# Patient Record
Sex: MALE | URBAN - METROPOLITAN AREA
[De-identification: names, ages, dates, MRNs, and addresses within clinical notes are randomized per-mention and may not be internally consistent; named-entity substitution may affect disease eponyms.]

---

## 2020-11-20 ENCOUNTER — APPOINTMENT (OUTPATIENT)
Dept: CT IMAGING | Age: 55
DRG: 057 | End: 2020-11-20
Payer: MEDICARE

## 2020-11-20 ENCOUNTER — APPOINTMENT (OUTPATIENT)
Dept: GENERAL RADIOLOGY | Age: 55
DRG: 057 | End: 2020-11-20
Payer: MEDICARE

## 2020-11-20 ENCOUNTER — HOSPITAL ENCOUNTER (INPATIENT)
Age: 55
LOS: 3 days | Discharge: HOME | DRG: 057 | End: 2020-11-23
Attending: EMERGENCY MEDICINE | Admitting: INTERNAL MEDICINE
Payer: MEDICARE

## 2020-11-20 PROBLEM — I63.9 ACUTE CEREBROVASCULAR ACCIDENT (CVA) (HCC): Status: ACTIVE | Noted: 2020-11-20

## 2020-11-20 LAB
A/G RATIO: 1.2 (ref 1.1–2.2)
ACETAMINOPHEN LEVEL: <5 UG/ML (ref 10–30)
ALBUMIN SERPL-MCNC: 4.4 G/DL (ref 3.4–5)
ALP BLD-CCNC: 79 U/L (ref 40–129)
ALT SERPL-CCNC: 13 U/L (ref 10–40)
AMPHETAMINE SCREEN, URINE: ABNORMAL
ANION GAP SERPL CALCULATED.3IONS-SCNC: 12 MMOL/L (ref 3–16)
AST SERPL-CCNC: 16 U/L (ref 15–37)
BARBITURATE SCREEN URINE: ABNORMAL
BASE EXCESS VENOUS: -1.7 MMOL/L (ref -2–3)
BASOPHILS ABSOLUTE: 0.1 K/UL (ref 0–0.2)
BASOPHILS RELATIVE PERCENT: 0.5 %
BENZODIAZEPINE SCREEN, URINE: ABNORMAL
BILIRUB SERPL-MCNC: 0.4 MG/DL (ref 0–1)
BILIRUBIN URINE: NEGATIVE
BLOOD, URINE: NEGATIVE
BUN BLDV-MCNC: 16 MG/DL (ref 7–20)
CALCIUM SERPL-MCNC: 9.6 MG/DL (ref 8.3–10.6)
CANNABINOID SCREEN URINE: POSITIVE
CARBOXYHEMOGLOBIN: 6.6 % (ref 0–1.5)
CHLORIDE BLD-SCNC: 103 MMOL/L (ref 99–110)
CLARITY: CLEAR
CO2: 23 MMOL/L (ref 21–32)
COCAINE METABOLITE SCREEN URINE: ABNORMAL
COLOR: YELLOW
CREAT SERPL-MCNC: 1.1 MG/DL (ref 0.9–1.3)
EKG ATRIAL RATE: 71 BPM
EKG DIAGNOSIS: NORMAL
EKG P-R INTERVAL: 132 MS
EKG Q-T INTERVAL: 446 MS
EKG QRS DURATION: 98 MS
EKG QTC CALCULATION (BAZETT): 484 MS
EKG R AXIS: 57 DEGREES
EKG T AXIS: 56 DEGREES
EKG VENTRICULAR RATE: 71 BPM
EOSINOPHILS ABSOLUTE: 0 K/UL (ref 0–0.6)
EOSINOPHILS RELATIVE PERCENT: 0.2 %
ETHANOL: NORMAL MG/DL (ref 0–0.08)
GFR AFRICAN AMERICAN: >60
GFR NON-AFRICAN AMERICAN: >60
GLOBULIN: 3.7 G/DL
GLUCOSE BLD-MCNC: 146 MG/DL (ref 70–99)
GLUCOSE BLD-MCNC: 157 MG/DL (ref 70–99)
GLUCOSE URINE: NEGATIVE MG/DL
HCO3 VENOUS: 24 MMOL/L (ref 24–28)
HCT VFR BLD CALC: 53.4 % (ref 40.5–52.5)
HEMOGLOBIN, VEN, REDUCED: 51.6 %
HEMOGLOBIN: 17.6 G/DL (ref 13.5–17.5)
INR BLD: 1.08 (ref 0.86–1.14)
KETONES, URINE: NEGATIVE MG/DL
LACTIC ACID: 1.1 MMOL/L (ref 0.4–2)
LACTIC ACID: 3.6 MMOL/L (ref 0.4–2)
LEUKOCYTE ESTERASE, URINE: NEGATIVE
LYMPHOCYTES ABSOLUTE: 2 K/UL (ref 1–5.1)
LYMPHOCYTES RELATIVE PERCENT: 11.1 %
Lab: ABNORMAL
MCH RBC QN AUTO: 31.8 PG (ref 26–34)
MCHC RBC AUTO-ENTMCNC: 33 G/DL (ref 31–36)
MCV RBC AUTO: 96.4 FL (ref 80–100)
METHADONE SCREEN, URINE: ABNORMAL
METHEMOGLOBIN VENOUS: 0.7 % (ref 0–1.5)
MICROSCOPIC EXAMINATION: NORMAL
MONOCYTES ABSOLUTE: 1 K/UL (ref 0–1.3)
MONOCYTES RELATIVE PERCENT: 5.2 %
NEUTROPHILS ABSOLUTE: 15.4 K/UL (ref 1.7–7.7)
NEUTROPHILS RELATIVE PERCENT: 83 %
NITRITE, URINE: NEGATIVE
O2 SAT, VEN: 44 %
OPIATE SCREEN URINE: ABNORMAL
OXYCODONE URINE: ABNORMAL
PCO2, VEN: 45.3 MMHG (ref 41–51)
PDW BLD-RTO: 15.1 % (ref 12.4–15.4)
PERFORMED ON: ABNORMAL
PH UA: 6
PH UA: 6 (ref 5–8)
PH VENOUS: 7.34 (ref 7.35–7.45)
PHENCYCLIDINE SCREEN URINE: ABNORMAL
PLATELET # BLD: 267 K/UL (ref 135–450)
PMV BLD AUTO: 9.8 FL (ref 5–10.5)
PO2, VEN: 26.2 MMHG (ref 25–40)
POTASSIUM REFLEX MAGNESIUM: 3.7 MMOL/L (ref 3.5–5.1)
PROPOXYPHENE SCREEN: ABNORMAL
PROTEIN UA: NEGATIVE MG/DL
PROTHROMBIN TIME: 12.5 SEC (ref 10–13.2)
RBC # BLD: 5.54 M/UL (ref 4.2–5.9)
SALICYLATE, SERUM: 1.2 MG/DL (ref 15–30)
SODIUM BLD-SCNC: 138 MMOL/L (ref 136–145)
SPECIFIC GRAVITY UA: 1.01 (ref 1–1.03)
TCO2 CALC VENOUS: 25 MMOL/L
TOTAL PROTEIN: 8.1 G/DL (ref 6.4–8.2)
TROPONIN: <0.01 NG/ML
URINE TYPE: NORMAL
UROBILINOGEN, URINE: 0.2 E.U./DL
WBC # BLD: 18.5 K/UL (ref 4–11)

## 2020-11-20 PROCEDURE — 2580000003 HC RX 258: Performed by: INTERNAL MEDICINE

## 2020-11-20 PROCEDURE — 82803 BLOOD GASES ANY COMBINATION: CPT

## 2020-11-20 PROCEDURE — 85025 COMPLETE CBC W/AUTO DIFF WBC: CPT

## 2020-11-20 PROCEDURE — 6370000000 HC RX 637 (ALT 250 FOR IP): Performed by: PHYSICIAN ASSISTANT

## 2020-11-20 PROCEDURE — U0003 INFECTIOUS AGENT DETECTION BY NUCLEIC ACID (DNA OR RNA); SEVERE ACUTE RESPIRATORY SYNDROME CORONAVIRUS 2 (SARS-COV-2) (CORONAVIRUS DISEASE [COVID-19]), AMPLIFIED PROBE TECHNIQUE, MAKING USE OF HIGH THROUGHPUT TECHNOLOGIES AS DESCRIBED BY CMS-2020-01-R: HCPCS

## 2020-11-20 PROCEDURE — 6360000002 HC RX W HCPCS: Performed by: PHYSICIAN ASSISTANT

## 2020-11-20 PROCEDURE — 6360000004 HC RX CONTRAST MEDICATION: Performed by: EMERGENCY MEDICINE

## 2020-11-20 PROCEDURE — 84484 ASSAY OF TROPONIN QUANT: CPT

## 2020-11-20 PROCEDURE — 36415 COLL VENOUS BLD VENIPUNCTURE: CPT

## 2020-11-20 PROCEDURE — G0480 DRUG TEST DEF 1-7 CLASSES: HCPCS

## 2020-11-20 PROCEDURE — 80307 DRUG TEST PRSMV CHEM ANLYZR: CPT

## 2020-11-20 PROCEDURE — 85610 PROTHROMBIN TIME: CPT

## 2020-11-20 PROCEDURE — 80053 COMPREHEN METABOLIC PANEL: CPT

## 2020-11-20 PROCEDURE — 6370000000 HC RX 637 (ALT 250 FOR IP): Performed by: INTERNAL MEDICINE

## 2020-11-20 PROCEDURE — 87040 BLOOD CULTURE FOR BACTERIA: CPT

## 2020-11-20 PROCEDURE — 99284 EMERGENCY DEPT VISIT MOD MDM: CPT

## 2020-11-20 PROCEDURE — 6360000002 HC RX W HCPCS

## 2020-11-20 PROCEDURE — 70496 CT ANGIOGRAPHY HEAD: CPT

## 2020-11-20 PROCEDURE — 2580000003 HC RX 258: Performed by: PHYSICIAN ASSISTANT

## 2020-11-20 PROCEDURE — 70450 CT HEAD/BRAIN W/O DYE: CPT

## 2020-11-20 PROCEDURE — 1200000000 HC SEMI PRIVATE

## 2020-11-20 PROCEDURE — 71045 X-RAY EXAM CHEST 1 VIEW: CPT

## 2020-11-20 PROCEDURE — 83605 ASSAY OF LACTIC ACID: CPT

## 2020-11-20 PROCEDURE — 81003 URINALYSIS AUTO W/O SCOPE: CPT

## 2020-11-20 PROCEDURE — 96374 THER/PROPH/DIAG INJ IV PUSH: CPT

## 2020-11-20 PROCEDURE — 70498 CT ANGIOGRAPHY NECK: CPT

## 2020-11-20 PROCEDURE — 93005 ELECTROCARDIOGRAM TRACING: CPT | Performed by: PHYSICIAN ASSISTANT

## 2020-11-20 RX ORDER — PROMETHAZINE HYDROCHLORIDE 25 MG/1
12.5 TABLET ORAL EVERY 6 HOURS PRN
Status: DISCONTINUED | OUTPATIENT
Start: 2020-11-20 | End: 2020-11-23 | Stop reason: HOSPADM

## 2020-11-20 RX ORDER — 0.9 % SODIUM CHLORIDE 0.9 %
1000 INTRAVENOUS SOLUTION INTRAVENOUS ONCE
Status: COMPLETED | OUTPATIENT
Start: 2020-11-20 | End: 2020-11-20

## 2020-11-20 RX ORDER — GABAPENTIN 300 MG/1
600 CAPSULE ORAL 3 TIMES DAILY
COMMUNITY

## 2020-11-20 RX ORDER — ASPIRIN 300 MG/1
300 SUPPOSITORY RECTAL DAILY
Status: DISCONTINUED | OUTPATIENT
Start: 2020-11-20 | End: 2020-11-23 | Stop reason: HOSPADM

## 2020-11-20 RX ORDER — POLYETHYLENE GLYCOL 3350 17 G/17G
17 POWDER, FOR SOLUTION ORAL DAILY PRN
Status: DISCONTINUED | OUTPATIENT
Start: 2020-11-20 | End: 2020-11-23 | Stop reason: HOSPADM

## 2020-11-20 RX ORDER — AZITHROMYCIN 250 MG/1
500 TABLET, FILM COATED ORAL ONCE
Status: COMPLETED | OUTPATIENT
Start: 2020-11-20 | End: 2020-11-20

## 2020-11-20 RX ORDER — CYCLOBENZAPRINE HCL 10 MG
10 TABLET ORAL 3 TIMES DAILY PRN
COMMUNITY

## 2020-11-20 RX ORDER — PANTOPRAZOLE SODIUM 40 MG/1
40 TABLET, DELAYED RELEASE ORAL
Status: DISCONTINUED | OUTPATIENT
Start: 2020-11-21 | End: 2020-11-23 | Stop reason: HOSPADM

## 2020-11-20 RX ORDER — DIPHENHYDRAMINE HYDROCHLORIDE 50 MG/ML
25 INJECTION INTRAMUSCULAR; INTRAVENOUS ONCE
Status: COMPLETED | OUTPATIENT
Start: 2020-11-20 | End: 2020-11-20

## 2020-11-20 RX ORDER — LIDOCAINE 40 MG/G
CREAM TOPICAL 3 TIMES DAILY PRN
COMMUNITY

## 2020-11-20 RX ORDER — IBUPROFEN 600 MG/1
600 TABLET ORAL 3 TIMES DAILY
Status: ON HOLD | COMMUNITY
End: 2020-11-23 | Stop reason: HOSPADM

## 2020-11-20 RX ORDER — LANOLIN ALCOHOL/MO/W.PET/CERES
3 CREAM (GRAM) TOPICAL NIGHTLY PRN
Status: DISCONTINUED | OUTPATIENT
Start: 2020-11-20 | End: 2020-11-23 | Stop reason: HOSPADM

## 2020-11-20 RX ORDER — ASPIRIN 81 MG/1
81 TABLET ORAL DAILY
Status: DISCONTINUED | OUTPATIENT
Start: 2020-11-20 | End: 2020-11-23 | Stop reason: HOSPADM

## 2020-11-20 RX ORDER — ONDANSETRON 2 MG/ML
4 INJECTION INTRAMUSCULAR; INTRAVENOUS EVERY 6 HOURS PRN
Status: DISCONTINUED | OUTPATIENT
Start: 2020-11-20 | End: 2020-11-23 | Stop reason: HOSPADM

## 2020-11-20 RX ORDER — GABAPENTIN 300 MG/1
600 CAPSULE ORAL 3 TIMES DAILY
Status: DISCONTINUED | OUTPATIENT
Start: 2020-11-20 | End: 2020-11-23 | Stop reason: HOSPADM

## 2020-11-20 RX ORDER — ATORVASTATIN CALCIUM 40 MG/1
40 TABLET, FILM COATED ORAL NIGHTLY
Status: DISCONTINUED | OUTPATIENT
Start: 2020-11-20 | End: 2020-11-23 | Stop reason: HOSPADM

## 2020-11-20 RX ORDER — SODIUM CHLORIDE 0.9 % (FLUSH) 0.9 %
10 SYRINGE (ML) INJECTION EVERY 12 HOURS SCHEDULED
Status: DISCONTINUED | OUTPATIENT
Start: 2020-11-20 | End: 2020-11-23 | Stop reason: HOSPADM

## 2020-11-20 RX ORDER — DIPHENHYDRAMINE HYDROCHLORIDE 50 MG/ML
INJECTION INTRAMUSCULAR; INTRAVENOUS
Status: COMPLETED
Start: 2020-11-20 | End: 2020-11-20

## 2020-11-20 RX ORDER — OMEPRAZOLE 20 MG/1
20 CAPSULE, DELAYED RELEASE ORAL DAILY
COMMUNITY

## 2020-11-20 RX ORDER — SODIUM CHLORIDE 0.9 % (FLUSH) 0.9 %
10 SYRINGE (ML) INJECTION PRN
Status: DISCONTINUED | OUTPATIENT
Start: 2020-11-20 | End: 2020-11-23 | Stop reason: HOSPADM

## 2020-11-20 RX ORDER — LANOLIN ALCOHOL/MO/W.PET/CERES
3 CREAM (GRAM) TOPICAL NIGHTLY PRN
Status: DISCONTINUED | OUTPATIENT
Start: 2020-11-21 | End: 2020-11-20

## 2020-11-20 RX ORDER — LABETALOL HYDROCHLORIDE 5 MG/ML
10 INJECTION, SOLUTION INTRAVENOUS ONCE
Status: DISCONTINUED | OUTPATIENT
Start: 2020-11-20 | End: 2020-11-23 | Stop reason: HOSPADM

## 2020-11-20 RX ORDER — ATORVASTATIN CALCIUM 80 MG/1
40 TABLET, FILM COATED ORAL NIGHTLY
Status: ON HOLD | COMMUNITY
End: 2020-11-23 | Stop reason: SDUPTHER

## 2020-11-20 RX ORDER — CYCLOBENZAPRINE HCL 10 MG
10 TABLET ORAL 3 TIMES DAILY PRN
Status: DISCONTINUED | OUTPATIENT
Start: 2020-11-20 | End: 2020-11-23 | Stop reason: HOSPADM

## 2020-11-20 RX ORDER — METHYLPREDNISOLONE 4 MG/1
4 TABLET ORAL SEE ADMIN INSTRUCTIONS
Status: ON HOLD | COMMUNITY
End: 2020-11-23 | Stop reason: HOSPADM

## 2020-11-20 RX ADMIN — DIPHENHYDRAMINE HYDROCHLORIDE 25 MG: 50 INJECTION INTRAMUSCULAR; INTRAVENOUS at 12:15

## 2020-11-20 RX ADMIN — GABAPENTIN 600 MG: 300 CAPSULE ORAL at 20:47

## 2020-11-20 RX ADMIN — CEFTRIAXONE 1 G: 1 INJECTION, POWDER, FOR SOLUTION INTRAMUSCULAR; INTRAVENOUS at 14:47

## 2020-11-20 RX ADMIN — Medication 10 ML: at 20:48

## 2020-11-20 RX ADMIN — SODIUM CHLORIDE 1000 ML: 9 INJECTION, SOLUTION INTRAVENOUS at 15:44

## 2020-11-20 RX ADMIN — IOPAMIDOL 80 ML: 755 INJECTION, SOLUTION INTRAVENOUS at 12:15

## 2020-11-20 RX ADMIN — AZITHROMYCIN MONOHYDRATE 500 MG: 250 TABLET ORAL at 14:47

## 2020-11-20 RX ADMIN — CYCLOBENZAPRINE 10 MG: 10 TABLET, FILM COATED ORAL at 20:48

## 2020-11-20 ASSESSMENT — PAIN DESCRIPTION - PAIN TYPE
TYPE: CHRONIC PAIN
TYPE: ACUTE PAIN
TYPE: CHRONIC PAIN

## 2020-11-20 ASSESSMENT — PAIN DESCRIPTION - ORIENTATION
ORIENTATION: MID
ORIENTATION: RIGHT;LEFT
ORIENTATION: LEFT

## 2020-11-20 ASSESSMENT — ENCOUNTER SYMPTOMS
COUGH: 0
ABDOMINAL PAIN: 0
SHORTNESS OF BREATH: 0

## 2020-11-20 ASSESSMENT — PAIN SCALES - GENERAL
PAINLEVEL_OUTOF10: 10
PAINLEVEL_OUTOF10: 10
PAINLEVEL_OUTOF10: 4

## 2020-11-20 ASSESSMENT — PAIN DESCRIPTION - LOCATION
LOCATION: BACK;NECK
LOCATION: GENERALIZED
LOCATION: KNEE

## 2020-11-20 ASSESSMENT — PAIN DESCRIPTION - DESCRIPTORS
DESCRIPTORS: ACHING;TIGHTNESS
DESCRIPTORS: TINGLING
DESCRIPTORS: ACHING

## 2020-11-20 ASSESSMENT — PAIN DESCRIPTION - DIRECTION: RADIATING_TOWARDS: `

## 2020-11-20 NOTE — ED PROVIDER NOTES
ED Attending Attestation Note     Date of evaluation: 11/20/2020    This patient was seen by the advance practice provider. I have seen and examined the patient, agree with the workup, evaluation, management and diagnosis. The care plan has been discussed. I have reviewed the ECG and concur with the NIURKA's interpretation. My assessment reveals presents with altered mental status. He had change in mental status at work. Was seen at the [place] yesterday for some medical reaso. When he comes into the ED here via ambulance he is very loud he is got some torticollis type movements of his upper extremities. He is complaining of left-sided numbness. Feels like his head is swimming. I did order Benadryl in case the prescribed medication may have caused a dystonic reaction.      Stephanie Sanchez MD  11/20/20 [id number]

## 2020-11-20 NOTE — ED PROVIDER NOTES
[address] ENCOUNTER          PHYSICIAN ASSISTANT NOTE       Date of evaluation: 11/20/2020    Chief Complaint     Altered Mental Status (seen at the [facility] yesterday, given steroid for unknown reason, now feels like \"brain is sideways\")      History of Present Illness     Emmanuel Eli is a 62 y.o. male with no known PMH who follows with the [facility] who presents to the ED via EMS with AMS, left sided headache, pain, numbness, and weakness that began less than an hour ago while at work. EMS reports patient is diaphoretic and will be speaking normally for a few sentences, but then yells and is clearly upset. EMS further reports a normal finger stick blood sugar. Pt states he feels like he is falling and cannot sit up properly while in the bed. Pt endorses left sided tingling and weakness, blurry vision and continuously yells that he does not feel right. Pt is able to recall that he had a stroke about 2 years ago. Pt denies any recent alcohol use or substance abuse. Pt is unable to properly answer multiple other questions by both EMS and ER staff. Review of Systems     Review of Systems   Constitutional: Positive for diaphoresis. Negative for fever. Eyes: Positive for visual disturbance. Respiratory: Negative for cough and shortness of breath. Cardiovascular: Negative for chest pain. Gastrointestinal: Negative for abdominal pain. Neurological: Positive for weakness, numbness and headaches. Psychiatric/Behavioral: Positive for agitation. Limited initial ROS due to patient status. Past Medical, Surgical, Family, and Social History     He has no past medical history on file. He has no past surgical history on file. His family history is not on file. He reports that he has been smoking. He has been smoking about 1.00 pack per day. He has never used smokeless tobacco. He reports current alcohol use. He reports current drug use. Drug: Marijuana.     Medications Previous Medications    ATORVASTATIN (LIPITOR) 80 MG TABLET    Take 40 mg by mouth nightly    CYCLOBENZAPRINE (FLEXERIL) 10 MG TABLET    Take 10 mg by mouth 3 times daily as needed for Muscle spasms    GABAPENTIN (NEURONTIN) 300 MG CAPSULE    Take 600 mg by mouth 3 times daily. IBUPROFEN (ADVIL;MOTRIN) 600 MG TABLET    Take 600 mg by mouth 3 times daily    LIDOCAINE (LMX) 4 % CREAM    Apply topically 3 times daily as needed for Pain Apply topically to painful joints    METHYLPREDNISOLONE (MEDROL DOSEPACK) 4 MG TABLET    Take 4 mg by mouth See Admin Instructions Rx given in [facility] ED on 11/18. OMEPRAZOLE (PRILOSEC) 20 MG DELAYED RELEASE CAPSULE    Take 20 mg by mouth daily       Allergies     He has No Known Allergies. Physical Exam     INITIAL VITALS: BP: (!) 183/119,Temp: 98.6 °F (37 °C), Pulse: 80, Resp: 11, SpO2: 99 %   Physical Exam  Constitutional:       General: He is in acute distress. Appearance: He is diaphoretic. HENT:      Head: Normocephalic and atraumatic. Eyes:      Extraocular Movements: Extraocular movements intact. Pupils: Pupils are equal, round, and reactive to light. Neck:      Musculoskeletal: Normal range of motion. No neck rigidity. Cardiovascular:      Rate and Rhythm: Regular rhythm. Tachycardia present. Heart sounds: Normal heart sounds. Pulmonary:      Breath sounds: Normal breath sounds. Abdominal:      Palpations: Abdomen is soft. Musculoskeletal:         General: No swelling. Skin:     General: Skin is warm. Capillary Refill: Capillary refill takes less than 2 seconds. Findings: No erythema or rash. Neurological:      Mental Status: He is alert. He is disoriented. Cranial Nerves: No dysarthria or facial asymmetry. Sensory: Sensation is intact. Motor: Weakness (Decreased  strength in left hand 1/5. Unable to lift and hold left arm and left leg. ) present.          Diagnostic Results     EKG   Interpreted in conjunction destructive osseous process or fracture. PARANASAL SINUSES / MASTOIDS: No acute sinusitis or mastoiditis. ORBITS: Normal.      EXTRACRANIAL SOFT TISSUES: Normal.      OTHER: None. IMPRESSION:      1. Occlusion left vertebral artery. 2. Narrowing M1 segment right middle cerebral artery. 3. Remote left occipital infarct. 4. Cerebral parenchymal volume loss and chronic small vessel ischemic changes in the white matter. CTA HEAD W CONTRAST   Final Result      1. Occlusion left vertebral artery. 2. 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 3. 1.4 x 0.9 cm left parotid nodule, nonspecific. PROCEDURE: CT ANGIOGRAPHY HEAD WITH/WITHOUT CONTRAST      INDICATION: headache, tingling      COMPARISON: CT head 11/20/2020. TECHNIQUE: Axial CT imaging obtained through the head prior to and following administration of IV contrast. Axial images, multiplanar reformatted images, and maximum intensity projection images were reviewed for CT angiographic technique. IV contrast: 80 mL Isovue 370. FINDINGS:      ANTERIOR CIRCULATION: Mild calcific plaque of the cavernous right internal carotid artery without stenosis. Anterior cerebral arteries normal. Mild narrowing of M1 segment right middle cerebral artery. POSTERIOR CIRCULATION: Occluded left vertebral artery. Mild atherosclerotic calcification of the right vertebral artery without stenosis. Patent basilar artery. Normal posterior cerebral arteries. INTRACRANIAL VENOUS SYSTEM: Small arachnoid granulations superior sagittal sinus. INTRACRANIAL HEMORRHAGE: None. VENTRICLES: Mildly dilated related to parenchymal volume loss. BRAIN PARENCHYMA: Encephalomalacia of left occipital lobe consistent with remote infarct stable compared to 11/20/2020 CT head. Mild cerebral parenchymal volume loss.  Mild low-attenuation in the deep and periventricular white matter. SKULL: No destructive osseous process or fracture. PARANASAL SINUSES / MASTOIDS: No acute sinusitis or mastoiditis. ORBITS: Normal.      EXTRACRANIAL SOFT TISSUES: Normal.      OTHER: None. IMPRESSION:      1. Occlusion left vertebral artery. 2. Narrowing M1 segment right middle cerebral artery. 3. Remote left occipital infarct. 4. Cerebral parenchymal volume loss and chronic small vessel ischemic changes in the white matter. CT HEAD WO CONTRAST   Final Result      1. Mild inferior right maxillary sinus mucosal thickening. No evidence of acute sinusitis. 2. Remote cortical infarct left occipital lobe. Mild small vessel ischemic disease. No hemorrhage, mass, or recent infarct identified.           LABS:   Results for orders placed or performed during the hospital encounter of 11/20/20   CBC Auto Differential   Result Value Ref Range    WBC 18.5 (H) 4.0 - 11.0 K/uL    RBC 5.54 4.20 - 5.90 M/uL    Hemoglobin 17.6 (H) 13.5 - 17.5 g/dL    Hematocrit 53.4 (H) 40.5 - 52.5 %    MCV 96.4 80.0 - 100.0 fL    MCH 31.8 26.0 - 34.0 pg    MCHC 33.0 31.0 - 36.0 g/dL    RDW 15.1 12.4 - 15.4 %    Platelets 246 564 - 563 K/uL    MPV 9.8 5.0 - 10.5 fL    Neutrophils % 83.0 %    Lymphocytes % 11.1 %    Monocytes % 5.2 %    Eosinophils % 0.2 %    Basophils % 0.5 %    Neutrophils Absolute 15.4 (H) 1.7 - 7.7 K/uL    Lymphocytes Absolute 2.0 1.0 - 5.1 K/uL    Monocytes Absolute 1.0 0.0 - 1.3 K/uL    Eosinophils Absolute 0.0 0.0 - 0.6 K/uL    Basophils Absolute 0.1 0.0 - 0.2 K/uL   Troponin   Result Value Ref Range    Troponin <0.01 <0.01 ng/mL   Comprehensive Metabolic Panel w/ Reflex to MG   Result Value Ref Range    Sodium 138 136 - 145 mmol/L    Potassium reflex Magnesium 3.7 3.5 - 5.1 mmol/L    Chloride 103 99 - 110 mmol/L    CO2 23 21 - 32 mmol/L    Anion Gap 12 3 - 16    Glucose 157 (H) 70 - 99 mg/dL    BUN 16 7 - 20 mg/dL    CREATININE 1.1 0.9 - 1.3 mg/dL GFR Non-African American >60 >60    GFR African American >60 >60    Calcium 9.6 8.3 - 10.6 mg/dL    Total Protein 8.1 6.4 - 8.2 g/dL    Alb 4.4 3.4 - 5.0 g/dL    Albumin/Globulin Ratio 1.2 1.1 - 2.2    Total Bilirubin 0.4 0.0 - 1.0 mg/dL    Alkaline Phosphatase 79 40 - 129 U/L    ALT 13 10 - 40 U/L    AST 16 15 - 37 U/L    Globulin 3.7 g/dL   Ethanol   Result Value Ref Range    Ethanol Lvl None Detected mg/dL   Salicylate   Result Value Ref Range    Salicylate, Serum 1.2 (L) 15.0 - 30.0 mg/dL   Acetaminophen level   Result Value Ref Range    Acetaminophen Level <5 (L) 10 - 30 ug/mL   Protime-INR   Result Value Ref Range    Protime 12.5 10.0 - 13.2 sec    INR 1.08 0.86 - 1.14   Lactate, plasma   Result Value Ref Range    Lactic Acid 3.6 (H) 0.4 - 2.0 mmol/L   Blood gas, venous (Lab)   Result Value Ref Range    pH, Mateo 7.343 (L) 7.350 - 7.450    pCO2, Mateo 45.3 41.0 - 51.0 mmHg    pO2, Mateo 26.2 25.0 - 40.0 mmHg    HCO3, Venous 24.0 24.0 - 28.0 mmol/L    Base Excess, Mateo -1.7 -2.0 - 3.0 mmol/L    O2 Sat, Mateo 44 Not established %    Carboxyhemoglobin 6.6 (H) 0.0 - 1.5 %    MetHgb, Mateo 0.7 0.0 - 1.5 %    TC02 (Calc), Mateo 25 mmol/L    Hemoglobin, Mateo, Reduced 51.60 %   POCT Glucose   Result Value Ref Range    POC Glucose 146 (H) 70 - 99 mg/dl    Performed on ACCU-CHEK    EKG 12 Lead   Result Value Ref Range    Ventricular Rate 71 BPM    Atrial Rate 71 BPM    P-R Interval 132 ms    QRS Duration 98 ms    Q-T Interval 446 ms    QTc Calculation (Bazett) 484 ms    R Axis 57 degrees    T Axis 56 degrees    Diagnosis       EKG performed in ER and to be interpreted by ER physician. Confirmed by MD, ER ([id number]),  Joao Graham ([phone]) on 11/20/2020 12:36:24 PM       RECENT VITALS:  BP: (!) 178/106, Temp: 98.6 °F (37 °C), Pulse: 61,Resp: 11, SpO2: 97 %     Procedures       ED Course     Nursing Notes, Past Medical Hx, Past Surgical Hx, Social Hx, Allergies, and Family Hx were reviewed.     The patient was given the followingmedications:  Orders Placed This Encounter   Medications    diphenhydrAMINE (BENADRYL) 50 MG/ML injection     ANISA HIDALGO: cabinet override    diphenhydrAMINE (BENADRYL) injection 25 mg    iopamidol (ISOVUE-370) 76 % injection 80 mL    labetalol (NORMODYNE;TRANDATE) injection 10 mg    cefTRIAXone (ROCEPHIN) 1 g IVPB in 50 mL D5W minibag     Order Specific Question:   Antimicrobial Indications     Answer:   Pneumonia (CAP)    azithromycin (ZITHROMAX) tablet 500 mg     Order Specific Question:   Antimicrobial Indications     Answer:   Pneumonia (CAP)       CONSULTS:  IP CONSULT TO PHARMACY  PHARMACY TO CHANGE BASE FLUIDS  IP CONSULT TO STROKE TEAM  IP CONSULT TO HOSPITALIST  IP CONSULT TO NEUROSURGERY  IP CONSULT TO [address] [place] [address] / ASSESSMENT / Peyton Seek is a 62 y.o. male who is followed by the [place] presents with altered mental status as well as with various symptoms including left sided headache, visual changes, left arm/leg numbness and weakness. Presentation, timing of symptom onset and physical exam were all concerning for a stroke. A code stroke was called and protocol was followed. IV was accessed, 25mg Benadryl was given (for possible dystonic rxn), blood work and CT scan were obtained. After head CT was completed, pt returned to room. Repeat exam revealed resolved left sided weakness/numbness. No focal deficits. Patient A&Ox3. His mental status improved and he was able to explain his symptoms further. Pt endorses left sided ear pain, dizziness and weakness in his left side, although admits these symptoms are greatly improving and pt is able to move all extremities and speak in full sentences. Pt reports he had similar symptoms 2 days ago, was seen at the [facility] and given a steroid prescription. Pt explains his symptoms resolved once he went home from the hospital, but today they came back and were much worse.  Pt currently denies any chest pain, shortness of breath, abdominal pain, N/V/D, or leg swelling. There are no further complaints at this time. Case was discussed with the stroke team but given rapidly improving symptoms, TPA was not given. IV access was established. Labs including CBC, BMP, Troponin, BNP, VBG and Lactate, CXR, and head CT/CTA were obtained. CXR showed a right medial basilar consolidation. CT head revealed no acute hemorrhage. It did reveal a remote left occipital infarct. CTA results revealed left vertebral occulusion and aneurysmal dilation of the left internal carotid artery as mentioned above in the results. The initial left sided deficits on physical exam do not correlate with the CTA findings which are also on the left side. CBC showed bumped white count to 18.5, which could be due to the patient's current steroid dose pack or the lung consolidation. However, lactate elevated to 3.6 points towards infection. Pt was swabbed for COVID-19, Rocephin & Zithromax were given to cover CAP and case discussed with hospitalist for admission for TIA rule out and community acquired pneumonia. This patient was also evaluated by the attending physician. All care plans were discussed and agreed upon. Clinical Impression     1. TIA (transient ischemic attack)    2. Pneumonia due to organism    3. Leukocytosis, unspecified type    4.  Numbness on left side        Disposition      DISPOSITION:   - Admission to: Medicine  - Time: 14:30       Joanna Tan PA-C  11/20/20 [id number]

## 2020-11-20 NOTE — ED NOTES
Home Med List is complete. Spoke with pharmacist from [facility] on phone. Given emergency situation, list verbally read over the phone and ROBERT will be faxed later. Medications in list are directly from the [place]. Please note:   1. Medrol Dose pack -- given an Rx for Medrol Dose pack on 11/18 from ED visit.         Meng Mckeon PharmD., BCPS   11/20/2020 12:32 PM  Wireless: [phone]

## 2020-11-20 NOTE — ED NOTES
Pt refuses to be catheterized.  PT reports he will try to urinate \"soon\"     Alejandra Azar RN  11/20/20 9473

## 2020-11-20 NOTE — H&P
Hospital Medicine History & Physical      PCP: No primary care provider on file. Date of Admission: 11/20/2020    Date of Service: Pt seen/examined on 11/20/2020    Chief Complaint:      Chief Complaint   Patient presents with    Altered Mental Status     seen at the [facility] yesterday, given steroid for unknown reason, now feels like \"brain is sideways\"       History Of Present Illness:   61-year-old male with no significant past medical history presented to the hospital with complains of left-sided numbness which initially started couple of days ago. Patient states that today his symptoms became really severe and he was having significant amount of numbness in his entire left side and felt like his nerves were being pressed. He denied any chest pain, shortness of breath, nausea or vomiting. He denied any weakness. Due to his symptoms not improving he decided to come to the hospital.  On arrival he was noted to be hypertensive blood pressure 180/120. Lab work showed lactic acid of 3.6 and leukocytosis of 18.5. CT of the head showed remote cortical infarct in the left occipital lobe. X-ray of the chest showed right medial basilar consolidation. CT angiogram head and neck was also performed which showed left vertebral artery occlusion and aneurysmal dilation of left proximal internal and distal common carotid artery with mural thrombus and atherosclerotic plaque. Patient was also noted to have some left-sided weakness while in the ED which actually improved after he came back from CT scan. Patient was also started on antibiotics for potential pneumonia and was admitted to the hospital for further management. Past Medical History:    No past medical history on file. Past Surgical History:    No past surgical history on file. Medications Prior to Admission:    Prior to Admission medications    Medication Sig Start Date End Date Taking?  Authorizing Provider   atorvastatin (LIPITOR) 80 MG tablet Take 40 mg by mouth nightly   Yes Historical Provider, MD   cyclobenzaprine (FLEXERIL) 10 MG tablet Take 10 mg by mouth 3 times daily as needed for Muscle spasms   Yes Historical Provider, MD   gabapentin (NEURONTIN) 300 MG capsule Take 600 mg by mouth 3 times daily. Yes Historical Provider, MD   ibuprofen (ADVIL;MOTRIN) 600 MG tablet Take 600 mg by mouth 3 times daily   Yes Historical Provider, MD   lidocaine (LMX) 4 % cream Apply topically 3 times daily as needed for Pain Apply topically to painful joints   Yes Historical Provider, MD   methylPREDNISolone (MEDROL DOSEPACK) 4 MG tablet Take 4 mg by mouth See Admin Instructions Rx given in [facility] ED on 11/18. Yes Historical Provider, MD   omeprazole (PRILOSEC) 20 MG delayed release capsule Take 20 mg by mouth daily   Yes Historical Provider, MD       Allergies:  Patient has no known allergies. Social History:       reports that he has been smoking. He has been smoking about 1.00 pack per day. He has never used smokeless tobacco. He reports current alcohol use. He reports current drug use. Drug: Marijuana. Family History:  Reviewed in detail and negative for DM, Early CAD, Cancer, CVA. Positive as follows:    No family history on file. REVIEW OF SYSTEMS:   Positive review  noted in the HPI. All other systems reviewed and negative.     PHYSICAL EXAM:    BP (!) 178/106   Pulse 61   Temp 98.6 °F (37 °C) (Oral)   Resp 11   Wt 212 lb 4.8 oz (96.3 kg)   SpO2 97%   General Appearance: alert and oriented to person, place and time, well developed and well- nourished, in no acute distress  Skin: warm and dry, no rash or erythema  Head: normocephalic and atraumatic  Eyes: pupils equal, round, and reactive to light, extraocular eye movements intact, conjunctivae normal  ENT: tympanic membrane, external ear and ear canal normal bilaterally, nose without deformity, nasal mucosa and turbinates normal without polyps  Neck: supple and non-tender without mass, no thyromegaly or thyroid nodules, no cervical lymphadenopathy  Pulmonary/Chest: clear to auscultation bilaterally- no wheezes, rales or rhonchi, normal air movement, no respiratory distress  Cardiovascular: normal rate, regular rhythm, normal S1 and S2, no murmurs, rubs, clicks, or gallops, Peripheral pulses good, Cap refill <3 sec, no carotid bruits  Abdomen: soft, non-tender, non-distended, normal bowel sounds, no masses or organomegaly  Extremities: no cyanosis, clubbing or edema  Musculoskeletal: normal range of motion, no joint swelling, deformity or tenderness  Neurologic: reflexes normal and symmetric, no cranial nerve deficit, gait, coordination and speech normal      LABS:    CBC   Recent Labs     11/20/20  1201   WBC 18.5*   HGB 17.6*   HCT 53.4*         RENAL  Recent Labs     11/20/20  1201      K 3.7      CO2 23   BUN 16   CREATININE 1.1     LFT'S  Recent Labs     11/20/20  1201   AST 16   ALT 13   BILITOT 0.4   ALKPHOS 79     COAG  Recent Labs     11/20/20  1202   INR 1.08     CARDIAC ENZYMES  Recent Labs     11/20/20  1201   TROPONINI <0.01       U/A:    Lab Results   Component Value Date    COLORU Yellow 11/20/2020    CLARITYU Clear 11/20/2020    SPECGRAV 1.015 11/20/2020    LEUKOCYTESUR Negative 11/20/2020    BLOODU Negative 11/20/2020    GLUCOSEU Negative 11/20/2020       ABG  No results found for: SCC2EUU, BEART, C6TTWGTH, PHART, THGBART, YAH9OXI, PO2ART, UJS7FLF    UA:  Recent Labs     11/20/20  1532   COLORU Yellow   PHUR 6.0  6.0   CLARITYU Clear   SPECGRAV 1.015   LEUKOCYTESUR Negative   UROBILINOGEN 0.2   BILIRUBINUR Negative   BLOODU Negative   GLUCOSEU Negative   KETUA Negative       Microbiology:  No results for input(s): LABGRAM, LABANAE, ORG, CXSURG in the last 72 hours. Nasal Culture: No results for input(s): ORG, MRSAPCR in the last 72 hours. Blood Culture: No results for input(s): BC, BLOODCULT2, ORG in the last 72 hours.   Fungal Culture:   No results for input(s): FUNGSM in the last 72 hours. No results for input(s): FUNCXBLD in the last 72 hours. CSF Culture:  No results for input(s): COLORCSF, APPEARCSF, CFTUBE, CLOTCSF, WBCCSF, RBCCSF, NEUTCSF, NUMCELLSCSF, LYMPHSCSF, MONOCSF, GLUCCSF, VOLCSF in the last 72 hours. Respiratory Culture:  No results for input(s): Bita Mercury in the last 72 hours. AFB:No results for input(s): AFBSMEAR in the last 72 hours. Urine Culture  No results for input(s): LABURIN in the last 72 hours. RADIOLOGY:    XR CHEST PORTABLE   Final Result      1. Right medial basilar consolidation   2. Normal-appearing heart size               CTA NECK W CONTRAST   Final Result      1. Occlusion left vertebral artery. 2. 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 3. 1.4 x 0.9 cm left parotid nodule, nonspecific. PROCEDURE: CT ANGIOGRAPHY HEAD WITH/WITHOUT CONTRAST      INDICATION: headache, tingling      COMPARISON: CT head 11/20/2020. TECHNIQUE: Axial CT imaging obtained through the head prior to and following administration of IV contrast. Axial images, multiplanar reformatted images, and maximum intensity projection images were reviewed for CT angiographic technique. IV contrast: 80 mL Isovue 370. FINDINGS:      ANTERIOR CIRCULATION: Mild calcific plaque of the cavernous right internal carotid artery without stenosis. Anterior cerebral arteries normal. Mild narrowing of M1 segment right middle cerebral artery. POSTERIOR CIRCULATION: Occluded left vertebral artery. Mild atherosclerotic calcification of the right vertebral artery without stenosis. Patent basilar artery. Normal posterior cerebral arteries. INTRACRANIAL VENOUS SYSTEM: Small arachnoid granulations superior sagittal sinus. INTRACRANIAL HEMORRHAGE: None. VENTRICLES: Mildly dilated related to parenchymal volume loss.       BRAIN PARENCHYMA: Encephalomalacia loss.      BRAIN PARENCHYMA: Encephalomalacia of left occipital lobe consistent with remote infarct stable compared to 11/20/2020 CT head. Mild cerebral parenchymal volume loss. Mild low-attenuation in the deep and periventricular white matter. SKULL: No destructive osseous process or fracture. PARANASAL SINUSES / MASTOIDS: No acute sinusitis or mastoiditis. ORBITS: Normal.      EXTRACRANIAL SOFT TISSUES: Normal.      OTHER: None. IMPRESSION:      1. Occlusion left vertebral artery. 2. Narrowing M1 segment right middle cerebral artery. 3. Remote left occipital infarct. 4. Cerebral parenchymal volume loss and chronic small vessel ischemic changes in the white matter. CT HEAD WO CONTRAST   Final Result      1. Mild inferior right maxillary sinus mucosal thickening. No evidence of acute sinusitis. 2. Remote cortical infarct left occipital lobe. Mild small vessel ischemic disease. No hemorrhage, mass, or recent infarct identified.           Previous medical records personally reviewed and analyzed         PHYSICIAN CERTIFICATION    I certify that Arlene Kumar is expected to be hospitalized for >2 midnights based on the following assessment and plan:    ASSESSMENT/PLAN:    Left arm and leg paresthesias concerning for TIA versus acute CVA  -Continue aspirin, statin  -MRI of the brain   -Neurochecks as appropriate  -Echo with bubble study  -Neurology consultation  -PT/OT, speech eval    Hypertensive urgency  -Permissive hypertension for now unless systolic blood pressure greater than 220    Severe sepsis secondary to pneumonia  -Continue Rocephin, azithromycin  -Cultures have been ordered  -Rule out COVID-19    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

## 2020-11-20 NOTE — ACP (ADVANCE CARE PLANNING)
ADVANCED CARE PLANNING    Martine Michael       :  1965              MRN:  [id number]      Purpose of Encounter: Advanced care planning in light of acute medical problems. Parties in attendance: :Jessica Swartz, Rebekah Barrett MD, . Decisional Capacity:Yes    Subjective: Patient/family understand in this voluntary conversation what inteventions and plans patient would want implemented in light of the following diagnoses:    Diagnosis: Active Problems:    Acute cerebrovascular accident (CVA) ([address]  Resolved Problems:    * No resolved hospital problems. *    Patients Medical Story: 57-year-old male with no significant past medical history presented to the hospital with left-sided numbness and tingling which have been going on for the past 3 days. Also noted to be in severe sepsis secondary pneumonia with lactic acidosis. Discussion highlights: I discussed with patient advanced directives/ impending END of life event in the light of above diagnosis, we discussed the needs for possible CPR, intubation/ventilator support, CPR. In such an event patient wishes to remain Full Code at this time. Goals of Care Determinations: Patient wishes to focus on getting better  Plan: Continue treatment for pneumonia and work-up symptoms of CVA  Code Status: At this time patient wishes to be full code  Time Spent with Patient: >16 minutes      Electronically signed by Rebekah Barrett MD on 2020 at 6:03 PM  Thank you No primary care provider on file. for the opportunity to be involved in this patient's care.

## 2020-11-21 LAB
CHOLESTEROL, TOTAL: 162 MG/DL (ref 0–199)
HCT VFR BLD CALC: 50.1 % (ref 40.5–52.5)
HDLC SERPL-MCNC: 38 MG/DL (ref 40–60)
HEMOGLOBIN: 16.8 G/DL (ref 13.5–17.5)
LDL CHOLESTEROL CALCULATED: 109 MG/DL
MCH RBC QN AUTO: 31.8 PG (ref 26–34)
MCHC RBC AUTO-ENTMCNC: 33.5 G/DL (ref 31–36)
MCV RBC AUTO: 95.1 FL (ref 80–100)
PDW BLD-RTO: 14.3 % (ref 12.4–15.4)
PLATELET # BLD: 211 K/UL (ref 135–450)
PMV BLD AUTO: 9.8 FL (ref 5–10.5)
PROCALCITONIN: 0.04 NG/ML (ref 0–0.15)
RBC # BLD: 5.27 M/UL (ref 4.2–5.9)
TRIGL SERPL-MCNC: 75 MG/DL (ref 0–150)
VLDLC SERPL CALC-MCNC: 15 MG/DL
WBC # BLD: 11.9 K/UL (ref 4–11)

## 2020-11-21 PROCEDURE — 97161 PT EVAL LOW COMPLEX 20 MIN: CPT

## 2020-11-21 PROCEDURE — 87070 CULTURE OTHR SPECIMN AEROBIC: CPT

## 2020-11-21 PROCEDURE — 80061 LIPID PANEL: CPT

## 2020-11-21 PROCEDURE — 87449 NOS EACH ORGANISM AG IA: CPT

## 2020-11-21 PROCEDURE — 1200000000 HC SEMI PRIVATE

## 2020-11-21 PROCEDURE — 83036 HEMOGLOBIN GLYCOSYLATED A1C: CPT

## 2020-11-21 PROCEDURE — 6360000002 HC RX W HCPCS: Performed by: INTERNAL MEDICINE

## 2020-11-21 PROCEDURE — 6370000000 HC RX 637 (ALT 250 FOR IP): Performed by: INTERNAL MEDICINE

## 2020-11-21 PROCEDURE — 85027 COMPLETE CBC AUTOMATED: CPT

## 2020-11-21 PROCEDURE — 6370000000 HC RX 637 (ALT 250 FOR IP): Performed by: STUDENT IN AN ORGANIZED HEALTH CARE EDUCATION/TRAINING PROGRAM

## 2020-11-21 PROCEDURE — 87205 SMEAR GRAM STAIN: CPT

## 2020-11-21 PROCEDURE — 2580000003 HC RX 258: Performed by: INTERNAL MEDICINE

## 2020-11-21 PROCEDURE — 84145 PROCALCITONIN (PCT): CPT

## 2020-11-21 PROCEDURE — 97116 GAIT TRAINING THERAPY: CPT

## 2020-11-21 PROCEDURE — 87040 BLOOD CULTURE FOR BACTERIA: CPT

## 2020-11-21 RX ORDER — CLOPIDOGREL BISULFATE 75 MG/1
75 TABLET ORAL DAILY
Status: DISCONTINUED | OUTPATIENT
Start: 2020-11-21 | End: 2020-11-23 | Stop reason: HOSPADM

## 2020-11-21 RX ADMIN — Medication 10 ML: at 22:00

## 2020-11-21 RX ADMIN — GABAPENTIN 600 MG: 300 CAPSULE ORAL at 21:51

## 2020-11-21 RX ADMIN — Medication 10 ML: at 09:01

## 2020-11-21 RX ADMIN — GABAPENTIN 600 MG: 300 CAPSULE ORAL at 14:18

## 2020-11-21 RX ADMIN — ASPIRIN 81 MG: 81 TABLET, COATED ORAL at 09:00

## 2020-11-21 RX ADMIN — GABAPENTIN 600 MG: 300 CAPSULE ORAL at 09:00

## 2020-11-21 RX ADMIN — CEFTRIAXONE 1 G: 1 INJECTION, POWDER, FOR SOLUTION INTRAMUSCULAR; INTRAVENOUS at 17:37

## 2020-11-21 RX ADMIN — Medication 3 MG: at 00:02

## 2020-11-21 RX ADMIN — ATORVASTATIN CALCIUM 40 MG: 40 TABLET, FILM COATED ORAL at 21:51

## 2020-11-21 RX ADMIN — PANTOPRAZOLE SODIUM 40 MG: 40 TABLET, DELAYED RELEASE ORAL at 05:36

## 2020-11-21 RX ADMIN — AZITHROMYCIN MONOHYDRATE 500 MG: 500 INJECTION, POWDER, LYOPHILIZED, FOR SOLUTION INTRAVENOUS at 14:18

## 2020-11-21 RX ADMIN — CLOPIDOGREL BISULFATE 75 MG: 75 TABLET ORAL at 14:18

## 2020-11-21 ASSESSMENT — PAIN DESCRIPTION - PROGRESSION: CLINICAL_PROGRESSION: NOT CHANGED

## 2020-11-21 ASSESSMENT — PAIN DESCRIPTION - PAIN TYPE: TYPE: ACUTE PAIN

## 2020-11-21 ASSESSMENT — PAIN DESCRIPTION - DESCRIPTORS: DESCRIPTORS: ACHING

## 2020-11-21 ASSESSMENT — PAIN SCALES - GENERAL
PAINLEVEL_OUTOF10: 6
PAINLEVEL_OUTOF10: 0
PAINLEVEL_OUTOF10: 0

## 2020-11-21 ASSESSMENT — PAIN DESCRIPTION - LOCATION: LOCATION: HEAD

## 2020-11-21 ASSESSMENT — PAIN DESCRIPTION - FREQUENCY: FREQUENCY: INTERMITTENT

## 2020-11-21 ASSESSMENT — PAIN DESCRIPTION - ONSET: ONSET: ON-GOING

## 2020-11-21 ASSESSMENT — PAIN - FUNCTIONAL ASSESSMENT: PAIN_FUNCTIONAL_ASSESSMENT: ACTIVITIES ARE NOT PREVENTED

## 2020-11-21 ASSESSMENT — PAIN DESCRIPTION - ORIENTATION: ORIENTATION: MID

## 2020-11-21 NOTE — PLAN OF CARE
Problem: Pain:  Goal: Pain level will decrease  Description: Pain level will decrease  Outcome: Ongoing  Note: C/o chronic pain in back/neck/knees. States consistent with chronic arthritis. Relieved with home medications. Will update as needed.

## 2020-11-21 NOTE — PROGRESS NOTES
auscultation, bilaterally without Rales/Wheezes/Rhonchi. Cardiovascular: Regular rate and rhythm with normal S1/S2 without murmurs, rubs or gallops. Abdomen: Soft, non-tender, non-distended with normal bowel sounds. Musculoskeletal: No clubbing, cyanosis or edema bilaterally. Full range of motion without deformity. Skin: Skin color, texture, turgor normal.  No rashes or lesions. Neurologic:  Neurovascularly intact without any focal sensory/motor deficits. Cranial nerves: II-XII intact, grossly non-focal.  Psychiatric: Alert and oriented, thought content appropriate, normal insight  Capillary Refill: Brisk,< 3 seconds   Peripheral Pulses: +2 palpable, equal bilaterally       Labs:   Recent Labs     11/20/20  1201 11/21/20  0601   WBC 18.5* 11.9*   HGB 17.6* 16.8   HCT 53.4* 50.1    211     Recent Labs     11/20/20  1201      K 3.7      CO2 23   BUN 16   CREATININE 1.1   CALCIUM 9.6     Recent Labs     11/20/20  1201   AST 16   ALT 13   BILITOT 0.4   ALKPHOS 79     Recent Labs     11/20/20  1202   INR 1.08     Recent Labs     11/20/20  1201   TROPONINI <0.01       Urinalysis:      Lab Results   Component Value Date    NITRU Negative 11/20/2020    BLOODU Negative 11/20/2020    SPECGRAV 1.015 11/20/2020    GLUCOSEU Negative 11/20/2020       Radiology:  XR CHEST PORTABLE   Final Result      1. Right medial basilar consolidation   2. Normal-appearing heart size               CTA NECK W CONTRAST   Final Result      1. Occlusion left vertebral artery. 2. 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 3. 1.4 x 0.9 cm left parotid nodule, nonspecific. PROCEDURE: CT ANGIOGRAPHY HEAD WITH/WITHOUT CONTRAST      INDICATION: headache, tingling      COMPARISON: CT head 11/20/2020.       TECHNIQUE: Axial CT imaging obtained through the head prior to and following administration of IV contrast. Axial images, following administration of IV contrast. Axial images, multiplanar reformatted images, and maximum intensity projection images were reviewed for CT angiographic technique. IV contrast: 80 mL Isovue 370. FINDINGS:      ANTERIOR CIRCULATION: Mild calcific plaque of the cavernous right internal carotid artery without stenosis. Anterior cerebral arteries normal. Mild narrowing of M1 segment right middle cerebral artery. POSTERIOR CIRCULATION: Occluded left vertebral artery. Mild atherosclerotic calcification of the right vertebral artery without stenosis. Patent basilar artery. Normal posterior cerebral arteries. INTRACRANIAL VENOUS SYSTEM: Small arachnoid granulations superior sagittal sinus. INTRACRANIAL HEMORRHAGE: None. VENTRICLES: Mildly dilated related to parenchymal volume loss. BRAIN PARENCHYMA: Encephalomalacia of left occipital lobe consistent with remote infarct stable compared to 11/20/2020 CT head. Mild cerebral parenchymal volume loss. Mild low-attenuation in the deep and periventricular white matter. SKULL: No destructive osseous process or fracture. PARANASAL SINUSES / MASTOIDS: No acute sinusitis or mastoiditis. ORBITS: Normal.      EXTRACRANIAL SOFT TISSUES: Normal.      OTHER: None. IMPRESSION:      1. Occlusion left vertebral artery. 2. Narrowing M1 segment right middle cerebral artery. 3. Remote left occipital infarct. 4. Cerebral parenchymal volume loss and chronic small vessel ischemic changes in the white matter. CT HEAD WO CONTRAST   Final Result      1. Mild inferior right maxillary sinus mucosal thickening. No evidence of acute sinusitis. 2. Remote cortical infarct left occipital lobe. Mild small vessel ischemic disease. No hemorrhage, mass, or recent infarct identified. Assessment/Plan:    Active Hospital Problems    Diagnosis    Acute cerebrovascular accident (CVA) ([facility] [address] [I63.9]     1.   This is a 61-year-old male admitted with a left arm numbness and tingling history of a CVA continue with aspirin and high-dose statin, patient refusing MRI. Neurology consult pending. 2.  Hypertension continue with antihypertensive medication per stroke protocol. 3.  ?  Sepsis leukocytosis on admission improving check procalcitonin level patient denies any symptoms. 4.  Urine drug screen positive for marijuana. DVT Prophylaxis: Lovenox subcu  Diet: DIET NO SALT ADDED (3-4 GM);  Low Sodium (2 GM)  Code Status: Full Code    PT/OT Eval Status:     Ama Garg MD

## 2020-11-21 NOTE — PROGRESS NOTES
Speech Language Pathology    Received order for speech evaluation. Reviewed chart. Spoke with RN. RN with no concerns at this time re: speech/swallowing, with pt currently tolerating regular texture diet / thin liquids. In an effort to conserve PPE/reduce exposure, will hold, and follow-up when patient out of COVID r/o, or if new concerns arise.     Kyle Alatorre M.A., [id number] [facility].[id number]  Speech-Language Pathologist  Pager: [phone]

## 2020-11-21 NOTE — CONSULTS
Neurology Consultation Note    Pt is COVID-19 rule-out and in attempts to limit exposure and/or spread of virus, face-to-face interview and examination are deferred at this time. Patient: Blair Thurman MRN: [id number]    YOB: 1965  Age: 62 y.o. Sex: male   Unit: [facility] [address] [facility]U Room/Bed: 9762/1251-82 Location: [address] [place] [address]    Date of Consultation: 11/21/2020  Date of Admission: 11/20/2020 11:51 AM ( LOS: 1 day )  Admitting Physician: Beau Elmore    Primary Care Physician: No primary care provider on file.    Consult Requested By: Wes Posada MD     Reason for Consult: \"cvava\"    ASSESSMENT & RECOMMENDATIONS     Assessment  - Symptoms consistent with likely acute ischemic stroke  - Per Hospitalist note, pt is refusing MRI; without this, very little else can be determined as to nature of stroke and in what ways stroke prevention can be achieved  - Less likely artery to artery stroke given that, as noted in Vascular Surgery note, all of the areas of potential arterial disease are on the left and for vessels to be involved in this event, the issues would have to be on the right (whereas remote left occipital stroke is either related to now occluded left vert, or, left vert occlusion (and therefore left occipital lobe infarct) is secondary to cardioembolism)  - If embolic in nature, then would need extensive cardiac evaluation; of course, without MRI this is much harder to determine  - Clearly has risk factors of poorly controlled BP and smoking which could be responsible for stroke but need controlling nonetheless    Recommendations  - Offer pt adequate amount of Ativan in order to attempt MRI brain  - Agree with ASA 81mg and Plavix 75mg daily, even though he was ASA naive prior to admission; would do this for 14 days and then switch to ASA 325mg daily monotherapy  - Increase atorvastatin to 80mg daily (high-dose)  - SBP goal <160 for next week and then ultimate goal of SBP <130-140 AND DBP <90 at ALL times  - Maintain A1c <7.0  - If pt refuses MRI and therefore embolic pattern of stroke cannot be excluded, then will need 30 day cardiac event monitor followed by loop recorder, if event monitor negative for afib/flutter  - Depending on results of echo, focus on cardiac source of emboli may be made more compelling (eg, sig dilated LA may suggest PAF, although would also be more evidence of poorly controlled BP as well)  - Agree with f/u with Vascular Surgery as outpt to address left carotid stenosis, etc  - Leukocytosis as per primary service      SUBJECTIVE     Chief Complaint:   Consult is for acute left-sided numbness +/- left-sided mild, transient, weakness    History of Present Illness:  Arlene Kumar is a 62 y.o. man with PHx sig for no known medical issues but he is a smoker. Per chart review, pt presented to ER with left-sided numbness which had been ongoing for the two days prior. These symptoms worsened on the day of admission and it felt like the entire left side was involved. He did not report any weakness with this but was documented as having left-sided weakness in the ER. His BP was 180/120. He had a leukocytosis of 18.5, CXR showed some consolidation, and empiric antibiotics for PNA were started. CT showed a remote left occipital lobe cortical infarct. CTA of head and neck showed left vert occlusion as well as aneurysmal dilation of left prox ICA and distal CCA. Mural thrombus along with atherosclerotic plaque were apparent. his chronic medical conditions include, but are not limited to, his smoking which is chronic and ongoing and for which he has been conseled. he also has a history of GERD which is under good control on his current regimen.  he also has a history of HTN which is under good control on his current regimen     Past Medical History:  smoking      Past Surgical History:  Past Surgical History:   Procedure Laterality Date    APPENDECTOMY         Family History:  family history is not available at this time    Social History:  he reports that he has been smoking. He has been smoking about 1.00 pack per day. He has never used smokeless tobacco. He reports previous alcohol use. He reports current drug use. Drug: Marijuana. Medications:  No Known Allergies    Medications Prior to Admission: atorvastatin (LIPITOR) 80 MG tablet, Take 40 mg by mouth nightly  cyclobenzaprine (FLEXERIL) 10 MG tablet, Take 10 mg by mouth 3 times daily as needed for Muscle spasms  gabapentin (NEURONTIN) 300 MG capsule, Take 600 mg by mouth 3 times daily. ibuprofen (ADVIL;MOTRIN) 600 MG tablet, Take 600 mg by mouth 3 times daily  lidocaine (LMX) 4 % cream, Apply topically 3 times daily as needed for Pain Apply topically to painful joints  methylPREDNISolone (MEDROL DOSEPACK) 4 MG tablet, Take 4 mg by mouth See Admin Instructions Rx given in [place] ED on 11/18. omeprazole (PRILOSEC) 20 MG delayed release capsule, Take 20 mg by mouth daily    Review of Systems:  Unable to obtain    OBJECTIVE     Vitals:  Patient Vitals for the past 36 hrs:   BP Temp Temp src Pulse Resp SpO2 Height Weight   11/21/20 0825 (!) 137/93 99 °F (37.2 °C) Oral 64 14 94 % -- --   11/21/20 0600 -- -- -- 59 -- -- -- --   11/21/20 0515 (!) 139/91 98.5 °F (36.9 °C) Oral 69 18 96 % -- --   11/21/20 0200 -- -- -- 61 22 -- -- --   11/21/20 0000 -- -- -- 59 -- -- -- --   11/20/20 2335 129/89 97.8 °F (36.6 °C) Oral 64 16 96 % -- --   11/20/20 1936 (!) 184/108 97.6 °F (36.4 °C) Oral 70 16 100 % 5' 9\" (1.753 m) 212 lb 4.9 oz (96.3 kg)   11/20/20 1330 (!) 178/106 -- -- 61 11 97 % -- --   11/20/20 1159 (!) 183/119 98.6 °F (37 °C) Oral 80 11 99 % -- 212 lb 4.8 oz (96.3 kg)       Neurological Exam   Pt not personally examined due to COVID-19 pandemic and he is rule-out status for this. Per pt's bedside RN charting:  Pt without any neurologic deficits at this time    Imaging:   All reports below personally reviewed & actual images reviewed where indicated. Pertinent positives & negatives are addressed in Assessment & Plan section of note  CTA HEAD & NECK W CONTRAST  Images independently reviewed. Agree with findings. --LIANNE    Final Result   1. Occlusion left vertebral artery. 2. 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 3. 1.4 x 0.9 cm left parotid nodule, nonspecific. 4. Narrowing M1 segment right middle cerebral artery. 5. Remote left occipital infarct. 6. Cerebral parenchymal volume loss and chronic small vessel ischemic changes in the white matter. CT HEAD WO CONTRAST  Images independently reviewed. Agree with findings. --LIANNE    Final Result   1. Mild inferior right maxillary sinus mucosal thickening. No evidence of acute sinusitis. 2. Remote cortical infarct left occipital lobe. Mild small vessel ischemic disease. No hemorrhage, mass, or recent infarct identified. Laboratory Review: All results below personally reviewed. Pertinent positives & negatives are addressed in Assessment & Plan section of note  Recent Results (from the past 72 hour(s))   EKG 12 Lead    Collection Time: 11/20/20 11:58 AM   Result Value Ref Range    Ventricular Rate 71 BPM    Atrial Rate 71 BPM    P-R Interval 132 ms    QRS Duration 98 ms    Q-T Interval 446 ms    QTc Calculation (Bazett) 484 ms    R Axis 57 degrees    T Axis 56 degrees    Diagnosis       EKG performed in ER and to be interpreted by ER physician. Confirmed by MD, ER ([id number]),  Ramsey Daniels ([phone]) on 11/20/2020 12:36:24 PM   CBC Auto Differential    Collection Time: 11/20/20 12:01 PM   Result Value Ref Range    WBC 18.5 (H) 4.0 - 11.0 K/uL    RBC 5.54 4.20 - 5.90 M/uL    Hemoglobin 17.6 (H) 13.5 - 17.5 g/dL    Hematocrit 53.4 (H) 40.5 - 52.5 %    MCV 96.4 80.0 - 100.0 fL    MCH 31.8 26.0 - 34.0 pg    MCHC 33.0 31.0 - 36.0 g/dL    RDW 15.1 12.4 - 15.4 %    Platelets 267 135 - 450 K/uL    MPV 9.8 5.0 - 10.5 fL    Neutrophils % 83.0 %    Lymphocytes % 11.1 %    Monocytes % 5.2 %    Eosinophils % 0.2 %    Basophils % 0.5 %    Neutrophils Absolute 15.4 (H) 1.7 - 7.7 K/uL    Lymphocytes Absolute 2.0 1.0 - 5.1 K/uL    Monocytes Absolute 1.0 0.0 - 1.3 K/uL    Eosinophils Absolute 0.0 0.0 - 0.6 K/uL    Basophils Absolute 0.1 0.0 - 0.2 K/uL   Troponin    Collection Time: 11/20/20 12:01 PM   Result Value Ref Range    Troponin <0.01 <0.01 ng/mL   Comprehensive Metabolic Panel w/ Reflex to MG    Collection Time: 11/20/20 12:01 PM   Result Value Ref Range    Sodium 138 136 - 145 mmol/L    Potassium reflex Magnesium 3.7 3.5 - 5.1 mmol/L    Chloride 103 99 - 110 mmol/L    CO2 23 21 - 32 mmol/L    Anion Gap 12 3 - 16    Glucose 157 (H) 70 - 99 mg/dL    BUN 16 7 - 20 mg/dL    CREATININE 1.1 0.9 - 1.3 mg/dL    GFR Non-African American >60 >60    GFR African American >60 >60    Calcium 9.6 8.3 - 10.6 mg/dL    Total Protein 8.1 6.4 - 8.2 g/dL    Alb 4.4 3.4 - 5.0 g/dL    Albumin/Globulin Ratio 1.2 1.1 - 2.2    Total Bilirubin 0.4 0.0 - 1.0 mg/dL    Alkaline Phosphatase 79 40 - 129 U/L    ALT 13 10 - 40 U/L    AST 16 15 - 37 U/L    Globulin 3.7 g/dL   Ethanol    Collection Time: 11/20/20 12:01 PM   Result Value Ref Range    Ethanol Lvl None Detected mg/dL   Salicylate    Collection Time: 11/20/20 12:01 PM   Result Value Ref Range    Salicylate, Serum 1.2 (L) 15.0 - 30.0 mg/dL   Acetaminophen level    Collection Time: 11/20/20 12:01 PM   Result Value Ref Range    Acetaminophen Level <5 (L) 10 - 30 ug/mL   POCT Glucose    Collection Time: 11/20/20 12:01 PM   Result Value Ref Range    POC Glucose 146 (H) 70 - 99 mg/dl    Performed on ACCU-CHEK    Protime-INR    Collection Time: 11/20/20 12:02 PM   Result Value Ref Range    Protime 12.5 10.0 - 13.2 sec    INR 1.08 0.86 - 1.14   Blood gas, venous (Lab)    Collection Time: 11/20/20 12:20 PM   Result Value Ref Range    pH, Mateo 7.343 (L) 7.350 - 7.450 pCO2, Mateo 45.3 41.0 - 51.0 mmHg    pO2, Mateo 26.2 25.0 - 40.0 mmHg    HCO3, Venous 24.0 24.0 - 28.0 mmol/L    Base Excess, Mateo -1.7 -2.0 - 3.0 mmol/L    O2 Sat, Mateo 44 Not established %    Carboxyhemoglobin 6.6 (H) 0.0 - 1.5 %    MetHgb, Mateo 0.7 0.0 - 1.5 %    TC02 (Calc), Mateo 25 mmol/L    Hemoglobin, Mateo, Reduced 51.60 %   Lactate, plasma    Collection Time: 11/20/20 12:22 PM   Result Value Ref Range    Lactic Acid 3.6 (H) 0.4 - 2.0 mmol/L   Urinalysis, reflex to microscopic    Collection Time: 11/20/20  3:32 PM   Result Value Ref Range    Color, UA Yellow Straw/Yellow    Clarity, UA Clear Clear    Glucose, Ur Negative Negative mg/dL    Bilirubin Urine Negative Negative    Ketones, Urine Negative Negative mg/dL    Specific Gravity, UA 1.015 1.005 - 1.030    Blood, Urine Negative Negative    pH, UA 6.0 5.0 - 8.0    Protein, UA Negative Negative mg/dL    Urobilinogen, Urine 0.2 <2.0 E.U./dL    Nitrite, Urine Negative Negative    Leukocyte Esterase, Urine Negative Negative    Microscopic Examination Not Indicated     Urine Type Cleancatch    Urine Drug Screen    Collection Time: 11/20/20  3:32 PM   Result Value Ref Range    Amphetamine Screen, Urine Neg Negative <1000ng/mL    Barbiturate Screen, Ur Neg Negative <200 ng/mL    Benzodiazepine Screen, Urine Neg Negative <200 ng/mL    Cannabinoid Scrn, Ur POSITIVE (A) Negative <50 ng/mL    Cocaine Metabolite Screen, Urine Neg Negative <300 ng/mL    Opiate Scrn, Ur Neg Negative <300 ng/mL    PCP Screen, Urine Neg Negative <25 ng/mL    Methadone Screen, Urine Neg Negative <300 ng/mL    Propoxyphene Scrn, Ur Neg Negative <300 ng/mL    Oxycodone Urine Neg Negative <100 ng/ml    pH, UA 6.0     Drug Screen Comment: see below    Lactate, plasma    Collection Time: 11/20/20  5:52 PM   Result Value Ref Range    Lactic Acid 1.1 0.4 - 2.0 mmol/L   CBC    Collection Time: 11/21/20  6:01 AM   Result Value Ref Range    WBC 11.9 (H) 4.0 - 11.0 K/uL    RBC 5.27 4.20 - 5.90 M/uL

## 2020-11-21 NOTE — PLAN OF CARE
Problem: Pain:  Goal: Pain level will decrease  Description: Pain level will decrease  Note: Pt complaining of headache that has improved throughout the day. Will cont to monitor and assess pain. Problem: Airway Clearance - Ineffective  Goal: Achieve or maintain patent airway  Note: Pt COVID rule out. SpO2 >90% this shift. No complaints of SOB. Will cont to monitor      Problem: HEMODYNAMIC STATUS  Goal: Patient has stable vital signs and fluid balance  Note: Vital signs stable this shift. Q4 neuro checks being performed. Will cont to monitor.

## 2020-11-21 NOTE — PROGRESS NOTES
Pt admitted to 4th floor PCU for evaluation of stroke-like symptoms, acute sepsis. Vitals and assessments as charted. Denies shortness of breath, loss of taste/smell, diarrhea, fever/chills, cough, recent positive exposures to COVID. Somewhat anxious on initial assessment, calmed down as RN explained plan of care. Admission screenings as charted. C/o chronic back/neck pain. States has hx of CVA in 2018 leaving his R visual field decreased. Was able to perform occular movement without deficits, stroke scale negative at this time. Will update as needed.

## 2020-11-21 NOTE — PROGRESS NOTES
4 Eyes Admission Assessment     I agree as the admission nurse that 2 RN's have performed a thorough Head to Toe Skin Assessment on the patient. ALL assessment sites listed below have been assessed on admission. Areas assessed by both nurses:   [x]   Head, Face, and Ears   [x]   Shoulders, Back, and Chest  [x]   Arms, Elbows, and Hands   [x]   Coccyx, Sacrum, and Ischium  [x]   Legs, Feet, and Heels        Does the Patient have Skin Breakdown?   No         Santos Prevention initiated:  NA   Wound Care Orders initiated:  NA      [facility] nurse consulted for Pressure Injury (Stage 3,4, Unstageable, DTI, NWPT, and Complex wounds) or Santos score 18 or lower:  NA      Nurse 1 eSignature: Electronically signed by Carla Gee RN on 11/20/20 at 9:09 PM EST    **SHARE this note so that the co-signing nurse is able to place an eSignature**     Nurse 2 eSignature: Electronically signed by Cindy Flores RN on 11/22/20 at 9:10 AM EST

## 2020-11-21 NOTE — PROGRESS NOTES
Pt's nephew Renetta Simpson called, pt verbally authorized this RN to speak with him, updated him on pt's condition and plan of care, visitor restriction, and point of contact policy. Will update as needed.

## 2020-11-21 NOTE — PROGRESS NOTES
Physical Therapy    Facility/Department: [facility] [address] [facility]  Initial Assessment/Discharge Summary    NAME: Nash Capone  : 1965  MRN: [id number]    Date of Service: 2020    Discharge Recommendations:  Nash Capone scored a 24/24 on the AM-PAC short mobility form. At this time, no further PT is recommended upon discharge due to patient at baseline level of mobility. Recommend patient returns to prior setting with prior services. PT Equipment Recommendations  Equipment Needed: No  Other: owns cane    Assessment   Assessment: Patient demonstrates functional mobility that presents at his typical baseline. No loss of balance during gait in room with normal dylan, able to retrieve items from ground from standing steadily. Patient denies concerns related to stair negotiation upon d/c. No acute PT goals identified. Plan to d/c patient from PT caseload. Decision Making: Low Complexity  Patient Education: Patient educated on role of PT, use of call light, and PT recommendations - patient verbalizes understanding. Barriers to Learning: none  REQUIRES PT FOLLOW UP: No  Activity Tolerance  Activity Tolerance: Patient Tolerated treatment well       Patient Diagnosis(es): The primary encounter diagnosis was TIA (transient ischemic attack). Diagnoses of Pneumonia due to organism, Leukocytosis, unspecified type, and Numbness on left side were also pertinent to this visit. has a past medical history of Cerebral artery occlusion with cerebral infarction ([facility] [address] has a past surgical history that includes Appendectomy. Restrictions  Restrictions/Precautions  Restrictions/Precautions: (low fall risk, up as tolerated, diet no salt added - low sodium)  Vision/Hearing  Vision: Impaired(\"vision has been bad since a neuro stroke\" - per chart review 2018)  Vision Exceptions: Wears glasses for reading  Hearing: Within functional limits     Subjective  General  Chart Reviewed:  Yes  Additional Pertinent Hx: - Static: Good  Sitting - Dynamic: Good  Standing - Static: Good  Standing - Dynamic: Good        Plan   Plan  Times per week: 11/21 eval and d/c  Safety Devices  Type of devices: Call light within reach, Left in bed, Nurse notified      AM-PAC Score  AM-PAC Inpatient Mobility Raw Score : 24 (11/21/20 1304)  AM-PAC Inpatient T-Scale Score : 61.14 (11/21/20 1304)  Mobility Inpatient CMS 0-100% Score: 0 (11/21/20 1304)  Mobility Inpatient CMS G-Code Modifier : [address] (11/21/20 1304)          Goals  Short term goals  Time Frame for Short term goals: no acute PT goals identified  Patient Goals   Patient goals : \"to go home\"       Therapy Time   Individual Concurrent Group Co-treatment   Time In 1242         Time Out 1305         Minutes 23                 Timed Code Treatment Minutes: 8 minutes    Total Treatment Minutes: 23 Minutes    If patient discharges prior to next treatment, this note will serve as discharge summary.     Soto Arvizu PT, DPT #[id number]

## 2020-11-21 NOTE — CONSULTS
[facility]   Resident Consult Note      Reason for Consult: Internal carotid artery thrombus    History obtained from: Patient, EMR    History of Present Illness :    Mariana Alexis is a 62year old male who presented to the hospital with altered mental status, a left sided headache and numbness, and weakness of less than an hour before arriving at the ED. He states that his numbness began a few days ago. While at work yesterday on 11/20, Mr. Becky Armas noticed that his numbness was increasing in severity without improvement. Resolved shortly after arriving to the ED. While in the ED, lab work showed a lactic acid of 3.6 and leukocytosis of 18.5. CT head without contrast showed no hemorrhage, mass, or recent infarct. CTA with contrast of the head and neck showed a mural thrombus and atherosclerotic plague in the left distal common carotid artery and left vertebral artery stenosis. Mr. Becky Armas states that he had a stroke event 2 years ago that resulted in impairment in his vision. He states that he has residual impairment to this day. Imaging verifies a prior left occipital infarct. He doesn't remember where he was treated for the event. He takes a statin at home and doesn't remember if he was on any anticoagulation or antiplatelet therapy at home. He denies having any vascular surgeries or stents placed. Mr. Becky Armas is a smoker at home. He smokes about 1/2 pack of cigarettes per day. He is a former drinker. He states that he was a heavy drinker but didn't drink daily. He uses recreational marijuana. Mr. Becky Armas states that his parents had heart conditions but does not recall their diagnosis. He is followed by primary care at the [facility] in [place].        Past Medical History:        Diagnosis Date    Cerebral artery occlusion with cerebral infarction ([facility] [address] 02/01/2018    r side blindness       Past Surgical History:           Procedure Laterality Date    APPENDECTOMY         Allergies:  Patient has no day. He has never used smokeless tobacco.  ETOH:   reports previous alcohol use. DRUGS:   reports current drug use. Drug: Marijuana. ROS: Review of Systems:   A 14 point review of systems was conducted, significant findings as noted in HPI. Physical exam:    Vitals:    11/21/20 0825   BP: (!) 137/93   Pulse: 64   Resp: 14   Temp: 99 °F (37.2 °C)   SpO2: 94%       General appearance: alert, resting in bed  Neuro: A&Ox3, PERRL  Neck: trachea midline, no JVD, no carotid bruits  Lungs: Expiratory wheezing noted at the base of the lungs, symmetric chest wall rise and fall  Heart: RRR, no m/r/g  Abdomen: soft, non-tender, non-distended, +BS  Extremities: no edema, cap refill<2sec     Labs:    CBC:   Recent Labs     11/20/20  1201 11/21/20  0601   WBC 18.5* 11.9*   HGB 17.6* 16.8   HCT 53.4* 50.1   MCV 96.4 95.1    211     BMP:   Recent Labs     11/20/20  1201      K 3.7      CO2 23   BUN 16   CREATININE 1.1     PT/INR:   Recent Labs     11/20/20  1202   PROTIME 12.5   INR 1.08     APTT: No results for input(s): APTT in the last 72 hours. Liver Profile:  Lab Results   Component Value Date    AST 16 11/20/2020    ALT 13 11/20/2020    BILITOT 0.4 11/20/2020    ALKPHOS 79 11/20/2020   No results found for: CHOL, HDL, TRIG  UA:   Lab Results   Component Value Date    COLORU Yellow 11/20/2020    PHUR 6.0 11/20/2020    PHUR 6.0 11/20/2020    CLARITYU Clear 11/20/2020    SPECGRAV 1.015 11/20/2020    LEUKOCYTESUR Negative 11/20/2020    UROBILINOGEN 0.2 11/20/2020    BILIRUBINUR Negative 11/20/2020    BLOODU Negative 11/20/2020    GLUCOSEU Negative 11/20/2020       Imaging  XR CHEST PORTABLE   Final Result      1. Right medial basilar consolidation   2. Normal-appearing heart size               CTA NECK W CONTRAST   Final Result      1. Occlusion left vertebral artery. 2. 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 3. 1.4 x 0.9 cm left parotid nodule, nonspecific. PROCEDURE: CT ANGIOGRAPHY HEAD WITH/WITHOUT CONTRAST      INDICATION: headache, tingling      COMPARISON: CT head 11/20/2020. TECHNIQUE: Axial CT imaging obtained through the head prior to and following administration of IV contrast. Axial images, multiplanar reformatted images, and maximum intensity projection images were reviewed for CT angiographic technique. IV contrast: 80 mL Isovue 370. FINDINGS:      ANTERIOR CIRCULATION: Mild calcific plaque of the cavernous right internal carotid artery without stenosis. Anterior cerebral arteries normal. Mild narrowing of M1 segment right middle cerebral artery. POSTERIOR CIRCULATION: Occluded left vertebral artery. Mild atherosclerotic calcification of the right vertebral artery without stenosis. Patent basilar artery. Normal posterior cerebral arteries. INTRACRANIAL VENOUS SYSTEM: Small arachnoid granulations superior sagittal sinus. INTRACRANIAL HEMORRHAGE: None. VENTRICLES: Mildly dilated related to parenchymal volume loss. BRAIN PARENCHYMA: Encephalomalacia of left occipital lobe consistent with remote infarct stable compared to 11/20/2020 CT head. Mild cerebral parenchymal volume loss. Mild low-attenuation in the deep and periventricular white matter. SKULL: No destructive osseous process or fracture. PARANASAL SINUSES / MASTOIDS: No acute sinusitis or mastoiditis. ORBITS: Normal.      EXTRACRANIAL SOFT TISSUES: Normal.      OTHER: None. IMPRESSION:      1. Occlusion left vertebral artery. 2. Narrowing M1 segment right middle cerebral artery. 3. Remote left occipital infarct. 4. Cerebral parenchymal volume loss and chronic small vessel ischemic changes in the white matter. CTA HEAD W CONTRAST   Final Result      1. Occlusion left vertebral artery.    2. 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 3. 1.4 x 0.9 cm left parotid nodule, nonspecific. PROCEDURE: CT ANGIOGRAPHY HEAD WITH/WITHOUT CONTRAST      INDICATION: headache, tingling      COMPARISON: CT head 11/20/2020. TECHNIQUE: Axial CT imaging obtained through the head prior to and following administration of IV contrast. Axial images, multiplanar reformatted images, and maximum intensity projection images were reviewed for CT angiographic technique. IV contrast: 80 mL Isovue 370. FINDINGS:      ANTERIOR CIRCULATION: Mild calcific plaque of the cavernous right internal carotid artery without stenosis. Anterior cerebral arteries normal. Mild narrowing of M1 segment right middle cerebral artery. POSTERIOR CIRCULATION: Occluded left vertebral artery. Mild atherosclerotic calcification of the right vertebral artery without stenosis. Patent basilar artery. Normal posterior cerebral arteries. INTRACRANIAL VENOUS SYSTEM: Small arachnoid granulations superior sagittal sinus. INTRACRANIAL HEMORRHAGE: None. VENTRICLES: Mildly dilated related to parenchymal volume loss. BRAIN PARENCHYMA: Encephalomalacia of left occipital lobe consistent with remote infarct stable compared to 11/20/2020 CT head. Mild cerebral parenchymal volume loss. Mild low-attenuation in the deep and periventricular white matter. SKULL: No destructive osseous process or fracture. PARANASAL SINUSES / MASTOIDS: No acute sinusitis or mastoiditis. ORBITS: Normal.      EXTRACRANIAL SOFT TISSUES: Normal.      OTHER: None. IMPRESSION:      1. Occlusion left vertebral artery. 2. Narrowing M1 segment right middle cerebral artery. 3. Remote left occipital infarct. 4. Cerebral parenchymal volume loss and chronic small vessel ischemic changes in the white matter. CT HEAD WO CONTRAST   Final Result      1. Mild inferior right maxillary sinus mucosal thickening.  No evidence of acute

## 2020-11-21 NOTE — PROGRESS NOTES
Pt did not want anyone updated at this time.  Electronically signed by Nicholas Munoz RN on 11/21/2020 at 6:42 PM

## 2020-11-22 LAB
ANION GAP SERPL CALCULATED.3IONS-SCNC: 11 MMOL/L (ref 3–16)
BUN BLDV-MCNC: 14 MG/DL (ref 7–20)
CALCIUM SERPL-MCNC: 8.5 MG/DL (ref 8.3–10.6)
CHLORIDE BLD-SCNC: 104 MMOL/L (ref 99–110)
CO2: 23 MMOL/L (ref 21–32)
CREAT SERPL-MCNC: 1 MG/DL (ref 0.9–1.3)
ESTIMATED AVERAGE GLUCOSE: 111.2 MG/DL
GFR AFRICAN AMERICAN: >60
GFR NON-AFRICAN AMERICAN: >60
GLUCOSE BLD-MCNC: 92 MG/DL (ref 70–99)
HBA1C MFR BLD: 5.5 %
HCT VFR BLD CALC: 50.8 % (ref 40.5–52.5)
HEMOGLOBIN: 17.1 G/DL (ref 13.5–17.5)
L. PNEUMOPHILA SEROGP 1 UR AG: NORMAL
MCH RBC QN AUTO: 31.8 PG (ref 26–34)
MCHC RBC AUTO-ENTMCNC: 33.6 G/DL (ref 31–36)
MCV RBC AUTO: 94.6 FL (ref 80–100)
PDW BLD-RTO: 14.2 % (ref 12.4–15.4)
PLATELET # BLD: 212 K/UL (ref 135–450)
PMV BLD AUTO: 9.2 FL (ref 5–10.5)
POTASSIUM SERPL-SCNC: 3.7 MMOL/L (ref 3.5–5.1)
RBC # BLD: 5.36 M/UL (ref 4.2–5.9)
SODIUM BLD-SCNC: 138 MMOL/L (ref 136–145)
STREP PNEUMONIAE ANTIGEN, URINE: NORMAL
TSH REFLEX: 1.53 UIU/ML (ref 0.27–4.2)
WBC # BLD: 10 K/UL (ref 4–11)

## 2020-11-22 PROCEDURE — 36415 COLL VENOUS BLD VENIPUNCTURE: CPT

## 2020-11-22 PROCEDURE — 6370000000 HC RX 637 (ALT 250 FOR IP): Performed by: INTERNAL MEDICINE

## 2020-11-22 PROCEDURE — 6370000000 HC RX 637 (ALT 250 FOR IP): Performed by: STUDENT IN AN ORGANIZED HEALTH CARE EDUCATION/TRAINING PROGRAM

## 2020-11-22 PROCEDURE — 92610 EVALUATE SWALLOWING FUNCTION: CPT

## 2020-11-22 PROCEDURE — 1200000000 HC SEMI PRIVATE

## 2020-11-22 PROCEDURE — 80048 BASIC METABOLIC PNL TOTAL CA: CPT

## 2020-11-22 PROCEDURE — 83036 HEMOGLOBIN GLYCOSYLATED A1C: CPT

## 2020-11-22 PROCEDURE — 2580000003 HC RX 258: Performed by: INTERNAL MEDICINE

## 2020-11-22 PROCEDURE — 85027 COMPLETE CBC AUTOMATED: CPT

## 2020-11-22 PROCEDURE — 84443 ASSAY THYROID STIM HORMONE: CPT

## 2020-11-22 RX ADMIN — Medication 10 ML: at 21:16

## 2020-11-22 RX ADMIN — PANTOPRAZOLE SODIUM 40 MG: 40 TABLET, DELAYED RELEASE ORAL at 07:42

## 2020-11-22 RX ADMIN — Medication 10 ML: at 07:42

## 2020-11-22 RX ADMIN — CLOPIDOGREL BISULFATE 75 MG: 75 TABLET ORAL at 07:42

## 2020-11-22 RX ADMIN — Medication 3 MG: at 21:17

## 2020-11-22 RX ADMIN — ASPIRIN 81 MG: 81 TABLET, COATED ORAL at 07:42

## 2020-11-22 RX ADMIN — GABAPENTIN 600 MG: 300 CAPSULE ORAL at 13:54

## 2020-11-22 RX ADMIN — ATORVASTATIN CALCIUM 40 MG: 40 TABLET, FILM COATED ORAL at 21:16

## 2020-11-22 RX ADMIN — GABAPENTIN 600 MG: 300 CAPSULE ORAL at 07:42

## 2020-11-22 RX ADMIN — CYCLOBENZAPRINE 10 MG: 10 TABLET, FILM COATED ORAL at 21:16

## 2020-11-22 RX ADMIN — GABAPENTIN 600 MG: 300 CAPSULE ORAL at 21:17

## 2020-11-22 ASSESSMENT — PAIN DESCRIPTION - LOCATION
LOCATION: HEAD
LOCATION: HEAD
LOCATION: EAR

## 2020-11-22 ASSESSMENT — PAIN DESCRIPTION - PROGRESSION
CLINICAL_PROGRESSION: NOT CHANGED
CLINICAL_PROGRESSION: NOT CHANGED

## 2020-11-22 ASSESSMENT — PAIN DESCRIPTION - FREQUENCY
FREQUENCY: INTERMITTENT
FREQUENCY: INTERMITTENT

## 2020-11-22 ASSESSMENT — PAIN SCALES - GENERAL
PAINLEVEL_OUTOF10: 5
PAINLEVEL_OUTOF10: 2
PAINLEVEL_OUTOF10: 0
PAINLEVEL_OUTOF10: 4

## 2020-11-22 ASSESSMENT — PAIN DESCRIPTION - PAIN TYPE
TYPE: ACUTE PAIN

## 2020-11-22 ASSESSMENT — PAIN DESCRIPTION - DESCRIPTORS
DESCRIPTORS: ACHING
DESCRIPTORS: ACHING
DESCRIPTORS: NAGGING

## 2020-11-22 ASSESSMENT — PAIN - FUNCTIONAL ASSESSMENT
PAIN_FUNCTIONAL_ASSESSMENT: ACTIVITIES ARE NOT PREVENTED
PAIN_FUNCTIONAL_ASSESSMENT: ACTIVITIES ARE NOT PREVENTED

## 2020-11-22 ASSESSMENT — PAIN DESCRIPTION - ORIENTATION
ORIENTATION: POSTERIOR
ORIENTATION: LEFT

## 2020-11-22 ASSESSMENT — PAIN DESCRIPTION - ONSET
ONSET: ON-GOING
ONSET: ON-GOING

## 2020-11-22 NOTE — PLAN OF CARE
Problem: Airway Clearance - Ineffective  Goal: Achieve or maintain patent airway  11/22/2020 1101 by Irina Hand RN  Note: Pt COVID rule out. SpO2 >90% on room air this shift. No complaints of SOB. Will cont to monitor      Problem: Pain:  Goal: Pain level will decrease  Description: Pain level will decrease  Note: No complaints of pain this this shift. Will cont to monitor. Problem: HEMODYNAMIC STATUS  Goal: Patient has stable vital signs and fluid balance  Note: Vital signs stable this shift. Q4 neuro checks being performed. No complaints of numbness, tingling, pain, or weakness in extremities. Will cont to monitor.

## 2020-11-22 NOTE — PROGRESS NOTES
clear.  Neck: Supple, with full range of motion. No jugular venous distention. Trachea midline. Respiratory:  Normal respiratory effort. Clear to auscultation, bilaterally without Rales/Wheezes/Rhonchi. Cardiovascular: Regular rate and rhythm with normal S1/S2 without murmurs, rubs or gallops. Abdomen: Soft, non-tender, non-distended with normal bowel sounds. Musculoskeletal: No clubbing, cyanosis or edema bilaterally. Full range of motion without deformity. Skin: Skin color, texture, turgor normal.  No rashes or lesions. Neurologic:  Neurovascularly intact without any focal sensory/motor deficits. Cranial nerves: II-XII intact, grossly non-focal.  Psychiatric: Alert and oriented, thought content appropriate, normal insight  Capillary Refill: Brisk,< 3 seconds   Peripheral Pulses: +2 palpable, equal bilaterally       Labs:   Recent Labs     11/20/20  1201 11/21/20  0601 11/22/20  0613   WBC 18.5* 11.9* 10.0   HGB 17.6* 16.8 17.1   HCT 53.4* 50.1 50.8    211 212     Recent Labs     11/20/20  1201 11/22/20  0613    138   K 3.7 3.7    104   CO2 23 23   BUN 16 14   CREATININE 1.1 1.0   CALCIUM 9.6 8.5     Recent Labs     11/20/20  1201   AST 16   ALT 13   BILITOT 0.4   ALKPHOS 79     Recent Labs     11/20/20  1202   INR 1.08     Recent Labs     11/20/20  1201   TROPONINI <0.01       Urinalysis:      Lab Results   Component Value Date    NITRU Negative 11/20/2020    BLOODU Negative 11/20/2020    SPECGRAV 1.015 11/20/2020    GLUCOSEU Negative 11/20/2020       Radiology:  XR CHEST PORTABLE   Final Result      1. Right medial basilar consolidation   2. Normal-appearing heart size               CTA NECK W CONTRAST   Final Result      1. Occlusion left vertebral artery. 2. 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 3. 1.4 x 0.9 cm left parotid nodule, nonspecific.       PROCEDURE: CT ANGIOGRAPHY HEAD WITH/WITHOUT CONTRAST      INDICATION: headache, tingling      COMPARISON: CT head 11/20/2020. TECHNIQUE: Axial CT imaging obtained through the head prior to and following administration of IV contrast. Axial images, multiplanar reformatted images, and maximum intensity projection images were reviewed for CT angiographic technique. IV contrast: 80 mL Isovue 370. FINDINGS:      ANTERIOR CIRCULATION: Mild calcific plaque of the cavernous right internal carotid artery without stenosis. Anterior cerebral arteries normal. Mild narrowing of M1 segment right middle cerebral artery. POSTERIOR CIRCULATION: Occluded left vertebral artery. Mild atherosclerotic calcification of the right vertebral artery without stenosis. Patent basilar artery. Normal posterior cerebral arteries. INTRACRANIAL VENOUS SYSTEM: Small arachnoid granulations superior sagittal sinus. INTRACRANIAL HEMORRHAGE: None. VENTRICLES: Mildly dilated related to parenchymal volume loss. BRAIN PARENCHYMA: Encephalomalacia of left occipital lobe consistent with remote infarct stable compared to 11/20/2020 CT head. Mild cerebral parenchymal volume loss. Mild low-attenuation in the deep and periventricular white matter. SKULL: No destructive osseous process or fracture. PARANASAL SINUSES / MASTOIDS: No acute sinusitis or mastoiditis. ORBITS: Normal.      EXTRACRANIAL SOFT TISSUES: Normal.      OTHER: None. IMPRESSION:      1. Occlusion left vertebral artery. 2. Narrowing M1 segment right middle cerebral artery. 3. Remote left occipital infarct. 4. Cerebral parenchymal volume loss and chronic small vessel ischemic changes in the white matter. CTA HEAD W CONTRAST   Final Result      1. Occlusion left vertebral artery. 2. 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    3. 1.4 x 0.9 cm left parotid nodule, nonspecific. PROCEDURE: CT ANGIOGRAPHY HEAD WITH/WITHOUT CONTRAST      INDICATION: headache, tingling      COMPARISON: CT head 11/20/2020. TECHNIQUE: Axial CT imaging obtained through the head prior to and following administration of IV contrast. Axial images, multiplanar reformatted images, and maximum intensity projection images were reviewed for CT angiographic technique. IV contrast: 80 mL Isovue 370. FINDINGS:      ANTERIOR CIRCULATION: Mild calcific plaque of the cavernous right internal carotid artery without stenosis. Anterior cerebral arteries normal. Mild narrowing of M1 segment right middle cerebral artery. POSTERIOR CIRCULATION: Occluded left vertebral artery. Mild atherosclerotic calcification of the right vertebral artery without stenosis. Patent basilar artery. Normal posterior cerebral arteries. INTRACRANIAL VENOUS SYSTEM: Small arachnoid granulations superior sagittal sinus. INTRACRANIAL HEMORRHAGE: None. VENTRICLES: Mildly dilated related to parenchymal volume loss. BRAIN PARENCHYMA: Encephalomalacia of left occipital lobe consistent with remote infarct stable compared to 11/20/2020 CT head. Mild cerebral parenchymal volume loss. Mild low-attenuation in the deep and periventricular white matter. SKULL: No destructive osseous process or fracture. PARANASAL SINUSES / MASTOIDS: No acute sinusitis or mastoiditis. ORBITS: Normal.      EXTRACRANIAL SOFT TISSUES: Normal.      OTHER: None. IMPRESSION:      1. Occlusion left vertebral artery. 2. Narrowing M1 segment right middle cerebral artery. 3. Remote left occipital infarct. 4. Cerebral parenchymal volume loss and chronic small vessel ischemic changes in the white matter. CT HEAD WO CONTRAST   Final Result      1. Mild inferior right maxillary sinus mucosal thickening. No evidence of acute sinusitis. 2. Remote cortical infarct left occipital lobe.  Mild small vessel ischemic disease. No hemorrhage, mass, or recent infarct identified. Assessment/Plan:    Active Hospital Problems    Diagnosis    Acute cerebrovascular accident (CVA) ([facility] [address] [I63.9]     1. This is a 57-year-old male admitted with a left arm numbness and tingling history of a CVA continue with aspirin and high-dose statin, patient refusing MRI even with Ativan wants to be totally unconscious. Neurology consult appreciated, 2D echo pending, check TSH level and hemoglobin A1c, possible discharge home in a.m. after 2D echo with 30-day event monitor. 2.  Hypertension continue with antihypertensive medication per stroke protocol. 3.    No evidence of Sepsis leukocytosis on admission improving , procalcitonin level negative, patient denies any symptoms. Will discontinue antibiotics  4. Urine drug screen positive for marijuana. DVT Prophylaxis: Lovenox subcu  Diet: DIET NO SALT ADDED (3-4 GM);  Low Sodium (2 GM)  Code Status: Full Code    PT/OT Eval Status: not Indicated    Dispo -    Leta Green MD

## 2020-11-22 NOTE — PROGRESS NOTES
Pt did not want anyone updated at this time.  Electronically signed by Joe Nieves RN on 11/22/2020 at 6:45 PM

## 2020-11-22 NOTE — PROGRESS NOTES
Nurse in to check leads. Pt c/o headache/neck pain. Reported to Dr Loi Eli for possible PRN medication.  Electronically signed by Nataliia Tariq RN on 11/22/2020 at 4:35 PM

## 2020-11-22 NOTE — PLAN OF CARE
Patient will tolerate least restrictive diet without s/s of aspiration.     Betito Hinkle MA CCC/SLP [id number]

## 2020-11-22 NOTE — PROGRESS NOTES
Clinical Pharmacy Consult Note    62 y.o. male admitted with stroke like symptoms. Pharmacy has been asked by Gay Dumas PA-C, to adjust all drips to normal saline as appropriate based on compatibility to avoid fluid shifts since D5 is osmotically active. The following intermittent IV drips/infusions have been adjusted to saline:  None currently     Please be aware that patient may have D5W ordered as part of hypoglycemia orderset. Total IV fluid delivered to patient over last 24h: ~300mL    RPh will follow daily to ensure all new IVPBs + drips are in NS. Please call with questions.   Maya Gomez, PharmD, [address] [place] [address] Pharmacy: [phone]  [address] [place] wireless: [phone]  11/22/2020 1:01 PM

## 2020-11-22 NOTE — PROGRESS NOTES
Speech Language Pathology  Facility/Department: [place] [address] [facility]   CLINICAL BEDSIDE SWALLOW EVALUATION/discharge    NAME: Marcela Delgado  : 1965  MRN: [id number]    ADMISSION DATE: 2020  ADMITTING DIAGNOSIS: has Acute cerebrovascular accident (CVA) ([address] on their problem list.  ONSET DATE: 2020    Recent Chest Xray/CT of Chest: (  Impression         1. Right medial basilar consolidation    2. Normal-appearing heart size           CT head 2020  Impression         1. Mild inferior right maxillary sinus mucosal thickening. No evidence of acute sinusitis.      2. Remote cortical infarct left occipital lobe. Mild small vessel ischemic disease. No hemorrhage, mass, or recent infarct identified       Date of Eval: 2020  Evaluating Therapist: Jim Holloway    Current Diet level:  Current Diet : Regular  Current Liquid Diet : Thin      Primary Complaint:  None except left ear pain and reduced hearing (notified nursing re; this)       Pain:  Pain Assessment  Pain Assessment: left ear pain and reduced hearing (notified nursing re; this)      Reason for Referral  Marcela Delgado was referred for a bedside swallow evaluation to assess the efficiency of his swallow function, identify signs and symptoms of aspiration and make recommendations regarding safe dietary consistencies, effective compensatory strategies, and safe eating environment. Impression  Dysphagia Diagnosis: Swallow function appears grossly intact  Dysphagia Impression : Pt alert and fully oriented. Speech clear and pt denies any problems with communication, cognition or swallowing (nursing agrees to not noticing any issues either). Pt has been on diet since 2020 with no problems and lungs clear at present. Oral phase WFL. No s/s of aspiration with foods/liquids including 3 oz water test.  Pt declines any speech/cognitive assessment at this time. Recommend Regular with thins.    Educated pt to s/s of aspiration and if emerge or if begin to notice communciation/cognitive changes from baseline, to alert nursing staff and will come back to reassess if needed. Pt verbalized understanding. Dysphagia Outcome Severity Scale: Level 6: Within functional limits/Modified independence     Treatment Plan  Requires SLP Intervention: No  Duration/Frequency of Treatment: No further treatment warranted at this time  D/C Recommendations: To be determined       Recommended Diet and Intervention  Diet Solids Recommendation: Regular  Liquid Consistency Recommendation: Thin  Recommended Form of Meds: PO          Compensatory Swallowing Strategies  Compensatory Swallowing Strategies: Upright as possible for all oral intake    Treatment/Goals: none       General  Chart Reviewed: Yes  Behavior/Cognition: Cooperative; Alert;Pleasant mood  Breath Sounds: Clear  Communication Observation: Functional  Dentition: Some missing teeth  Patient Positioning: Upright in bed  Baseline Vocal Quality: Normal  Volitional Cough: (Did not assess d/t COVID rule out)  Prior Dysphagia History: None per pt or chart review  Consistencies Administered: Dysphagia Soft and Bite-Sized (Dysphagia III); Dysphagia Pureed (Dysphagia I); Thin - cup; Thin - straw; Ice Chips           Vision/Hearing  Vision  Vision: Impaired(indicated blurred vision since stroke)  Vision Exceptions: Wears glasses for distance  Hearing  Hearing: (Pt reported reduced hearing and some pain in left ear since stroke (nursing made aware))    Oral Motor Deficits  Oral/Motor  Oral Motor: Within functional limits    Oral Phase Dysfunction  Oral Phase  Oral Phase: WFL  Oral Phase  Oral Phase - Comment: WFL; functional mastication and managment of food and liquids. Indicators of Pharyngeal Phase Dysfunction   Pharyngeal Phase   Pharyngeal: Timely swallow initiation with no cough/wet vocal quality/throat clearing with any po trials.  Pt and nursing deny any problems with po since diet initiation on 11/20/2020. Prognosis  Prognosis  Prognosis for safe diet advancement: excellent  Individuals consulted  Consulted and agree with results and recommendations: Patient;RN    Education  Patient Education: Pt educated to role of dysphagia, what aspiration is and s/s of it, diet and strategy recommendations. Pt verbalized understanding. Patient Education Response: Verbalizes understanding  Safety Devices in place: Yes  Type of devices: Bed alarm in place;Call light within reach;Nurse notified       Therapy Time  SLP Individual Minutes  Time In: 5153  Time Out: 1320  Minutes: 15      D/W nursing Eusebia. Treatment goal:  None  Discharge goal:  \"back to my apartment\"      Liz Gonzalez MA CCC/SLP [id number]  11/22/2020 1:36 PM     If patient is discharged prior to next treatment, this note will serve as the discharge summary.

## 2020-11-23 ENCOUNTER — APPOINTMENT (OUTPATIENT)
Dept: CT IMAGING | Age: 55
DRG: 057 | End: 2020-11-23
Payer: MEDICARE

## 2020-11-23 VITALS
WEIGHT: 212.3 LBS | SYSTOLIC BLOOD PRESSURE: 132 MMHG | OXYGEN SATURATION: 98 % | HEART RATE: 61 BPM | RESPIRATION RATE: 16 BRPM | HEIGHT: 69 IN | DIASTOLIC BLOOD PRESSURE: 100 MMHG | BODY MASS INDEX: 31.44 KG/M2 | TEMPERATURE: 98.4 F

## 2020-11-23 LAB
ANION GAP SERPL CALCULATED.3IONS-SCNC: 10 MMOL/L (ref 3–16)
BUN BLDV-MCNC: 11 MG/DL (ref 7–20)
CALCIUM SERPL-MCNC: 8.7 MG/DL (ref 8.3–10.6)
CHLORIDE BLD-SCNC: 101 MMOL/L (ref 99–110)
CO2: 25 MMOL/L (ref 21–32)
CREAT SERPL-MCNC: 1.1 MG/DL (ref 0.9–1.3)
CULTURE, RESPIRATORY: NORMAL
ESTIMATED AVERAGE GLUCOSE: 111.2 MG/DL
GFR AFRICAN AMERICAN: >60
GFR NON-AFRICAN AMERICAN: >60
GLUCOSE BLD-MCNC: 86 MG/DL (ref 70–99)
GRAM STAIN RESULT: NORMAL
HBA1C MFR BLD: 5.5 %
HCT VFR BLD CALC: 51.6 % (ref 40.5–52.5)
HEMOGLOBIN: 17.3 G/DL (ref 13.5–17.5)
LV EF: 58 %
LVEF MODALITY: NORMAL
MCH RBC QN AUTO: 31.6 PG (ref 26–34)
MCHC RBC AUTO-ENTMCNC: 33.6 G/DL (ref 31–36)
MCV RBC AUTO: 94.2 FL (ref 80–100)
PDW BLD-RTO: 14.4 % (ref 12.4–15.4)
PLATELET # BLD: 201 K/UL (ref 135–450)
PMV BLD AUTO: 9.4 FL (ref 5–10.5)
POTASSIUM SERPL-SCNC: 3.4 MMOL/L (ref 3.5–5.1)
RBC # BLD: 5.48 M/UL (ref 4.2–5.9)
SARS-COV-2: NOT DETECTED
SODIUM BLD-SCNC: 136 MMOL/L (ref 136–145)
WBC # BLD: 9.7 K/UL (ref 4–11)

## 2020-11-23 PROCEDURE — 85027 COMPLETE CBC AUTOMATED: CPT

## 2020-11-23 PROCEDURE — C8929 TTE W OR WO FOL WCON,DOPPLER: HCPCS

## 2020-11-23 PROCEDURE — 70450 CT HEAD/BRAIN W/O DYE: CPT

## 2020-11-23 PROCEDURE — 80048 BASIC METABOLIC PNL TOTAL CA: CPT

## 2020-11-23 PROCEDURE — 6370000000 HC RX 637 (ALT 250 FOR IP): Performed by: STUDENT IN AN ORGANIZED HEALTH CARE EDUCATION/TRAINING PROGRAM

## 2020-11-23 PROCEDURE — 6370000000 HC RX 637 (ALT 250 FOR IP): Performed by: INTERNAL MEDICINE

## 2020-11-23 PROCEDURE — 2580000003 HC RX 258: Performed by: INTERNAL MEDICINE

## 2020-11-23 RX ORDER — ASPIRIN 81 MG/1
81 TABLET ORAL DAILY
Qty: 14 TABLET | Refills: 0 | COMMUNITY
Start: 2020-11-24 | End: 2020-12-08

## 2020-11-23 RX ORDER — ASPIRIN 325 MG
325 TABLET, DELAYED RELEASE (ENTERIC COATED) ORAL DAILY
Qty: 30 TABLET | Refills: 2 | Status: SHIPPED | OUTPATIENT
Start: 2020-12-07 | End: 2021-03-07

## 2020-11-23 RX ORDER — POTASSIUM CHLORIDE 20 MEQ/1
40 TABLET, EXTENDED RELEASE ORAL ONCE
Status: COMPLETED | OUTPATIENT
Start: 2020-11-23 | End: 2020-11-23

## 2020-11-23 RX ORDER — CLOPIDOGREL BISULFATE 75 MG/1
75 TABLET ORAL DAILY
Qty: 14 TABLET | Refills: 0 | Status: SHIPPED | OUTPATIENT
Start: 2020-11-24 | End: 2020-12-08

## 2020-11-23 RX ORDER — ATORVASTATIN CALCIUM 80 MG/1
80 TABLET, FILM COATED ORAL NIGHTLY
Qty: 30 TABLET | Refills: 3 | Status: SHIPPED | OUTPATIENT
Start: 2020-11-23

## 2020-11-23 RX ADMIN — PANTOPRAZOLE SODIUM 40 MG: 40 TABLET, DELAYED RELEASE ORAL at 06:46

## 2020-11-23 RX ADMIN — CYCLOBENZAPRINE 10 MG: 10 TABLET, FILM COATED ORAL at 08:43

## 2020-11-23 RX ADMIN — Medication 10 ML: at 08:37

## 2020-11-23 RX ADMIN — GABAPENTIN 600 MG: 300 CAPSULE ORAL at 13:21

## 2020-11-23 RX ADMIN — POTASSIUM CHLORIDE 40 MEQ: 1500 TABLET, EXTENDED RELEASE ORAL at 11:18

## 2020-11-23 RX ADMIN — CLOPIDOGREL BISULFATE 75 MG: 75 TABLET ORAL at 08:36

## 2020-11-23 RX ADMIN — ASPIRIN 81 MG: 81 TABLET, COATED ORAL at 08:36

## 2020-11-23 RX ADMIN — GABAPENTIN 600 MG: 300 CAPSULE ORAL at 08:36

## 2020-11-23 ASSESSMENT — PAIN SCALES - GENERAL
PAINLEVEL_OUTOF10: 0

## 2020-11-23 NOTE — PROGRESS NOTES
Clinical Pharmacy Progress Note    62 y.o. male admitted with stroke like symptoms. Pharmacy has been asked by Carol Ann Swan PA-C, to adjust all drips to normal saline as appropriate based on compatibility to avoid fluid shifts since D5 is osmotically active. The following intermittent IV drips/infusions have been adjusted to saline:  None currently     Total IV fluid delivered to patient over last 24h: None - just IV flushes    As patient is not being treated for pituitary tumor resection or requiring hypertonic saline (defined as >0.9% NaCl), pharmacy will sign off of IV review consult, per protocol. Please call with questions.   Julius Hampton, PharmD, BCPS  Wireless: [phone]  or [phone]  11/23/2020 9:45 AM

## 2020-11-23 NOTE — PROGRESS NOTES
Discharge instructions provided to and reviewed with patient. Reviewed but not limited to diet, activity, weight control, s/s of CVA/ TIA, and f/u appointments. All belongings returned. No further actions at this time.

## 2020-11-23 NOTE — PROGRESS NOTES
Neurology Progress Note  Seen for left sided weakness    Updates:   Continues to refuse MRI     ROS:   Unable to assess given COVID r/o isolation    Patient was admitted during Covid-19 pandemic and currently in droplet isolation. All efforts made to respect recommendations of social distancing and to decrease PPE usage have been made. Unable to perform face to face examination of the patient. Exam taken from nursing staff.  :Blood pressure (!) 138/98, pulse 75, temperature 98.2 °F (36.8 °C), temperature source Oral, resp. rate 19, height 5' 9\" (1.753 m), weight 212 lb 4.9 oz (96.3 kg), SpO2 97 %. Per RN documentation:  Patient is alert and fully oriented, follows commands  Antigravity throughout     NIH: 0      Labs:  A1C: 5.5  LDL: 109  TSH: 1.53  COVID pending    Studies:  CT head  1. Mild inferior right maxillary sinus mucosal thickening. No evidence of acute sinusitis.      2. Remote cortical infarct left occipital lobe. Mild small vessel ischemic disease. No hemorrhage, mass, or recent infarct identified     CTA neck  1. Occlusion left vertebral artery. 2. 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 3. 1.4 x 0.9 cm left parotid nodule, nonspecific. CTA head  1. Occlusion left vertebral artery. 2. Narrowing M1 segment right middle cerebral artery. 3. Remote left occipital infarct. 4. Cerebral parenchymal volume loss and chronic small vessel ischemic changes in the white matter.               Impression:  1. Left sided paraesthesia  2. Left hemiparesis  3. Tobacco abuse   4. Hypertension  5. Covid R/o      Alfred Gibson is a 62 y.o. male who presented with left hemiparesis and left sided paresthesia concerning for acute ischemic stroke. Patient continues to refuse MRI. Dilatation/Small aneurysm of distal L common carotid artery with peripheral mural thrombus/plaque. No stenosis.    Patient a

## 2020-11-23 NOTE — PROGRESS NOTES
Hospitalist Progress Note      PCP: No primary care provider on file. Date of Admission: 11/20/2020    Chief Complaint: confusion, L hand numbness    Subjective:     Feels well today, symptoms resolved. Patient hopeful to go home. No cp, sob. No palpitations. NSR on monitor       Medications:  Reviewed    Infusion Medications   Scheduled Medications    clopidogrel  75 mg Oral Daily    labetalol  10 mg Intravenous Once    atorvastatin  40 mg Oral Nightly    gabapentin  600 mg Oral TID    pantoprazole  40 mg Oral QAM AC    sodium chloride flush  10 mL Intravenous 2 times per day    enoxaparin  40 mg Subcutaneous Q24H    aspirin  81 mg Oral Daily    Or    aspirin  300 mg Rectal Daily     PRN Meds: cyclobenzaprine, sodium chloride flush, polyethylene glycol, promethazine **OR** ondansetron, perflutren lipid microspheres, melatonin      Intake/Output Summary (Last 24 hours) at 11/23/2020 1244  Last data filed at 11/23/2020 1119  Gross per 24 hour   Intake 610 ml   Output --   Net 610 ml       Physical Exam Performed:    BP (!) 141/98   Pulse 70   Temp 98.3 °F (36.8 °C) (Oral)   Resp 18   Ht 5' 9\" (1.753 m)   Wt 212 lb 4.9 oz (96.3 kg)   SpO2 100%   BMI 31.35 kg/m²     General appearance: No apparent distress, appears stated age and cooperative. Respiratory:  Normal respiratory effort. Clear to auscultation, bilaterally without Rales/Wheezes/Rhonchi. Cardiovascular: Regular rate and rhythm with normal S1/S2 without murmurs, rubs or gallops. Abdomen: Soft, non-tender, non-distended with normal bowel sounds. Musculoskeletal: No clubbing, cyanosis or edema bilaterally. Full range of motion without deformity. Skin: Skin color, texture, turgor normal.  No rashes or lesions. Neurologic:  Neurovascularly intact without any focal sensory/motor deficits.  Cranial nerves: II-XII intact, grossly non-focal.  Psychiatric: Alert and oriented, thought content appropriate, normal insight   Peripheral Pulses: +2 palpable, equal bilaterally       Labs:   Recent Labs     11/21/20  0601 11/22/20  0613 11/23/20  0515   WBC 11.9* 10.0 9.7   HGB 16.8 17.1 17.3   HCT 50.1 50.8 51.6    212 201     Recent Labs     11/22/20  0613 11/23/20  0515    136   K 3.7 3.4*    101   CO2 23 25   BUN 14 11   CREATININE 1.0 1.1   CALCIUM 8.5 8.7     No results for input(s): AST, ALT, BILIDIR, BILITOT, ALKPHOS in the last 72 hours. No results for input(s): INR in the last 72 hours. No results for input(s): Joyice Garfield in the last 72 hours. Urinalysis:      Lab Results   Component Value Date    NITRU Negative 11/20/2020    BLOODU Negative 11/20/2020    SPECGRAV 1.015 11/20/2020    GLUCOSEU Negative 11/20/2020       Radiology:  XR CHEST PORTABLE   Final Result      1. Right medial basilar consolidation   2. Normal-appearing heart size               CTA NECK W CONTRAST   Final Result      1. Occlusion left vertebral artery. 2. 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 3. 1.4 x 0.9 cm left parotid nodule, nonspecific. PROCEDURE: CT ANGIOGRAPHY HEAD WITH/WITHOUT CONTRAST      INDICATION: headache, tingling      COMPARISON: CT head 11/20/2020. TECHNIQUE: Axial CT imaging obtained through the head prior to and following administration of IV contrast. Axial images, multiplanar reformatted images, and maximum intensity projection images were reviewed for CT angiographic technique. IV contrast: 80 mL Isovue 370. FINDINGS:      ANTERIOR CIRCULATION: Mild calcific plaque of the cavernous right internal carotid artery without stenosis. Anterior cerebral arteries normal. Mild narrowing of M1 segment right middle cerebral artery. POSTERIOR CIRCULATION: Occluded left vertebral artery. Mild atherosclerotic calcification of the right vertebral artery without stenosis. Patent basilar artery.  Normal posterior cerebral arteries. INTRACRANIAL VENOUS SYSTEM: Small arachnoid granulations superior sagittal sinus. INTRACRANIAL HEMORRHAGE: None. VENTRICLES: Mildly dilated related to parenchymal volume loss. BRAIN PARENCHYMA: Encephalomalacia of left occipital lobe consistent with remote infarct stable compared to 11/20/2020 CT head. Mild cerebral parenchymal volume loss. Mild low-attenuation in the deep and periventricular white matter. SKULL: No destructive osseous process or fracture. PARANASAL SINUSES / MASTOIDS: No acute sinusitis or mastoiditis. ORBITS: Normal.      EXTRACRANIAL SOFT TISSUES: Normal.      OTHER: None. IMPRESSION:      1. Occlusion left vertebral artery. 2. Narrowing M1 segment right middle cerebral artery. 3. Remote left occipital infarct. 4. Cerebral parenchymal volume loss and chronic small vessel ischemic changes in the white matter. CTA HEAD W CONTRAST   Final Result      1. Occlusion left vertebral artery. 2. 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 3. 1.4 x 0.9 cm left parotid nodule, nonspecific. PROCEDURE: CT ANGIOGRAPHY HEAD WITH/WITHOUT CONTRAST      INDICATION: headache, tingling      COMPARISON: CT head 11/20/2020. TECHNIQUE: Axial CT imaging obtained through the head prior to and following administration of IV contrast. Axial images, multiplanar reformatted images, and maximum intensity projection images were reviewed for CT angiographic technique. IV contrast: 80 mL Isovue 370. FINDINGS:      ANTERIOR CIRCULATION: Mild calcific plaque of the cavernous right internal carotid artery without stenosis. Anterior cerebral arteries normal. Mild narrowing of M1 segment right middle cerebral artery. POSTERIOR CIRCULATION: Occluded left vertebral artery.  Mild atherosclerotic calcification of the right vertebral artery without stenosis. Patent basilar artery. Normal posterior cerebral arteries. INTRACRANIAL VENOUS SYSTEM: Small arachnoid granulations superior sagittal sinus. INTRACRANIAL HEMORRHAGE: None. VENTRICLES: Mildly dilated related to parenchymal volume loss. BRAIN PARENCHYMA: Encephalomalacia of left occipital lobe consistent with remote infarct stable compared to 11/20/2020 CT head. Mild cerebral parenchymal volume loss. Mild low-attenuation in the deep and periventricular white matter. SKULL: No destructive osseous process or fracture. PARANASAL SINUSES / MASTOIDS: No acute sinusitis or mastoiditis. ORBITS: Normal.      EXTRACRANIAL SOFT TISSUES: Normal.      OTHER: None. IMPRESSION:      1. Occlusion left vertebral artery. 2. Narrowing M1 segment right middle cerebral artery. 3. Remote left occipital infarct. 4. Cerebral parenchymal volume loss and chronic small vessel ischemic changes in the white matter. CT HEAD WO CONTRAST   Final Result      1. Mild inferior right maxillary sinus mucosal thickening. No evidence of acute sinusitis. 2. Remote cortical infarct left occipital lobe. Mild small vessel ischemic disease. No hemorrhage, mass, or recent infarct identified. Assessment/Plan:    Active Hospital Problems    Diagnosis    Acute cerebrovascular accident (CVA) ([facility] [address] [I63.9]     L arm weakness and numbness - in setting of prior CVA. Patient refusing MRI brain to rule out CVA more definitively. CT unremarkable. - continue ASA, plavix x 14 days then ASA alone. Continue statin at increased intensity   - follow up with neurology  - vascular surgery follow up for L ICA   - ECHO pending     htn- controlled, resume home medications on d/c     Sepsis ruled out         DVT Prophylaxis: lovenox   Diet: DIET NO SALT ADDED (3-4 GM);  Low Sodium (2 GM)  Code Status: Full Code    PT/OT Eval Status: not indicated, resolved sx     Dispo - home if ECHO unremarkable Beth Naranjo MD

## 2020-11-23 NOTE — PROGRESS NOTES
Occupational Therapy   Occupational Therapy Initial Assessment and Treatment and D/C  Date: 2020   Patient Name: Carl Chacko  MRN: [id number]     : 1965    Date of Service: 2020    Discharge Recommendations: Carl Chacko scored a 22/24 on the AM-PAC ADL Inpatient form. At this time, no further OT is recommended upon discharge. Recommend patient returns to prior setting with prior services. OT Equipment Recommendations  Equipment Needed: No    Assessment   Assessment: Pt tolerated session well and is function close to baseline. Pt has steady gait w/ no LOB. Pt supervision for fxl mobility and transfers, IND with bed mobility, supervision with ADL tasks at this time. Pt does not require OT services in acute care. D/C OT. Prognosis: Good  Decision Making: Low Complexity  OT Education: OT Role;Plan of Care  Patient Education: verb understanding  REQUIRES OT FOLLOW UP: No  Activity Tolerance  Activity Tolerance: Patient Tolerated treatment well  Safety Devices  Safety Devices in place: Yes(pt ad marina)  Type of devices: Nurse notified;Call light within reach;Gait belt           Patient Diagnosis(es): The primary encounter diagnosis was TIA (transient ischemic attack). Diagnoses of Pneumonia due to organism, Leukocytosis, unspecified type, and Numbness on left side were also pertinent to this visit. has a past medical history of Cerebral artery occlusion with cerebral infarction ([facility] [address] has a past surgical history that includes Appendectomy.            Restrictions  Restrictions/Precautions  Restrictions/Precautions: (low fall risk, up as tolerated, diet no salt added - low sodium)    Subjective   General  Chart Reviewed: Yes  Patient assessed for rehabilitation services?: Yes  Additional Pertinent Hx: ED  related to altered mental status, left sided headache, pain, numbness, weakness, TIA, HTN, severe sepsis secondary to pneumonia; PMH: HTN, CVA with decreased right visual field  Diagnosis: CVA    Social/Functional History  Social/Functional History  Lives With: Significant other  Type of Home: Apartment  Home Layout: One level  Home Access: Stairs to enter with rails  Entrance Stairs - Number of Steps: 10  Bathroom Shower/Tub: Tub/Shower unit  Bathroom Toilet: Standard  Bathroom Equipment: (None)  Home Equipment: [employer]  ADL Assistance: Independent  Homemaking Assistance: Independent  Ambulation Assistance: Independent(uses cane for all community mobility)  Transfer Assistance: Independent  Active : No  Additional Comments: Pt. denies falls in the past year       Objective        Orientation  Overall Orientation Status: Within Normal Limits     Balance  Sitting Balance: Supervision  Standing Balance: Supervision  Standing Balance  Time: 10 min  Activity: to and from bathroom, mobility in room  Functional Mobility  Functional - Mobility Device: No device  Assist Level: Stand by assistance(SBA to supervison throughout)  Toilet Transfers  Toilet - Technique: Ambulating  Equipment Used: Standard toilet  Toilet Transfer: Supervision  ADL  Grooming: Independent(face washing seated EOB with cloth)  Toileting: Supervision  Additional Comments: pt completed face washing and toileting this session, declined other ADL stating I did that already.         Bed mobility  Supine to Sit: Independent  Sit to Supine: Independent  Scooting: Independent  Transfers  Sit to stand: Supervision  Stand to sit: Supervision     Cognition  Overall Cognitive Status: WNL                 LUE AROM (degrees)  LUE AROM : WFL  Left Hand AROM (degrees)  Left Hand AROM: WFL  RUE AROM (degrees)  RUE AROM : WFL  Right Hand AROM (degrees)  Right Hand AROM: WFL  LUE Strength  Gross LUE Strength: WFL  RUE Strength  Gross RUE Strength: WFL                          AM-PAC Score        AM-PAC Inpatient Daily Activity Raw Score: 22 (11/23/20 1553)  AM-PAC Inpatient ADL T-Scale Score : 47.1 (11/23/20 1553)  ADL Inpatient CMS 0-100% Score: 25.8 (11/23/20 1553)  ADL Inpatient CMS G-Code Modifier : CJ (11/23/20 1553)      Therapy Time   Individual Concurrent Group Co-treatment   Time In 1350         Time Out 1413         Minutes 23           Timed Code Treatment Minutes:    13  Total Treatment Minutes:  [address], OTR/L

## 2020-11-23 NOTE — PROGRESS NOTES
1056: Discharge pending echo. Called echo lab COVID result needed prior to completing echo.    1100: COVID swab sent on 11/20, no result yet. Lab phoned to f/u on specimen. Lab to f/u with [facility] and will call Nurse once found. 1312: Called lab again to f/u on test results. Tech who answered reports Rebeccasherry Tanner has not looked into it yet. \" Encouraged the messenger to notify me if there is anyway I can expedite the process. [phone] called and left message [facility] cardiology lab to ask if they could do a bedside echo in efforts to prevent further delay of discharge, in the event that lab has lost the COVID specimen     1327: Per lab, should have COVID result by 1500.    1633: Pt returned from echo. Neuro placed order to repeat Head CT prior to DC.     1638: Medications retrieved from outpatient pharmacy. CT phoned and transport in for imaging.

## 2020-11-23 NOTE — PLAN OF CARE
Problem: Pain:  Goal: Pain level will decrease  Description: Pain level will decrease  Outcome: Met This Shift  Goal: Control of acute pain  Description: Control of acute pain  Outcome: Met This Shift  Goal: Control of chronic pain  Description: Control of chronic pain  Outcome: Met This Shift     Problem: Airway Clearance - Ineffective  Goal: Achieve or maintain patent airway  Outcome: Met This Shift     Problem: HEMODYNAMIC STATUS  Goal: Patient has stable vital signs and fluid balance  Outcome: Met This Shift     Problem: Falls - Risk of:  Goal: Will remain free from falls  Description: Will remain free from falls  Outcome: Met This Shift  Goal: Absence of physical injury  Description: Absence of physical injury  Outcome: Met This Shift

## 2020-11-23 NOTE — PROGRESS NOTES
Physician Progress Note      PATIENT:               Melissa Pineda  [facility] #:                  [id number]  :                       1965  ADMIT DATE:       2020 11:51 AM  DISCH DATE:  RESPONDING  PROVIDER #:        Myesha Rice MD          QUERY TEXT:    Noted documentation of sepsis and pneumonia in H&P, Sepsis ruled out. If   possible, please document in discharge summary the status of pneumonia:      The medical record reflects the following:  Risk Factors: NA  Clinical Indicators: Per pn  \"No evidence of Sepsis leukocytosis on   admission improving , procalcitonin level negative, patient denies any   symptoms\"; CXR: Right medial basilar consolidation; WBC: 18.5- 11.9, pt on   steroids prior to admission  Treatment: Rocephin and Zith x 2 days  Options provided:  -- Pneumonia was ruled out  -- Pneumonia present as evidenced by, Please document evidence. -- Other - I will add my own diagnosis  -- Disagree - Not applicable / Not valid  -- Disagree - Clinically unable to determine / Unknown  -- Refer to Clinical Documentation Reviewer    PROVIDER RESPONSE TEXT:    Pneumonia was ruled out after study. Query created by: Rafal Butts on 2020 12:57 PM      QUERY TEXT:    Dear Attending Provider,    Pt admitted with left arm numbness and tingling, with hx of CVA. Pt noted to   have \"acute ischemic stroke\" Per Neuologist and \"left side hemispheric TIA\"   per Neurosurgeon. If possible, please document in discharge summary if you are   evaluating and /or treating any of the following:     The medical record reflects the following:  Risk Factors: CVA, HTN  Clinical Indicators: Per Neurology \"Symptoms consistent with likely acute   ischemic stroke; Per Neurosurgeon \"total occlusion of the left vertebral   artery, thrombus and plaque in the left internal carotid artery, chronic old   infarct in his left parietal and his left occipital pole; patient has a left   side hemispheric TIA,

## 2020-11-23 NOTE — PLAN OF CARE
Problem: Pain:  Goal: Pain level will decrease  Description: Pain level will decrease  Outcome: Ongoing  Goal: Control of acute pain  Description: Control of acute pain  Outcome: Ongoing  Goal: Control of chronic pain  Description: Control of chronic pain  Outcome: Ongoing     Problem: Airway Clearance - Ineffective  Goal: Achieve or maintain patent airway  Outcome: Ongoing     Problem: HEMODYNAMIC STATUS  Goal: Patient has stable vital signs and fluid balance  Outcome: Ongoing

## 2020-11-24 NOTE — PROGRESS NOTES
In spite of multiple education attempts, patient continues to refuse both mechanical and chemical VTE prophylaxis. He currently exhibits no s/s of a DVT, and appears hemodynamically stable.  Will continue to monitor for changes in status and/or VS.     Electronically signed by Reza Malhotra RN on 11/24/2020 at 6:30 AM

## 2020-11-24 NOTE — DISCHARGE SUMMARY
Hospital Medicine Discharge Summary    Patient ID: Amari Bryant      Patient's PCP: No primary care provider on file. Admit Date: 11/20/2020     Discharge Date: 11/23/2020      Admitting Physician: Arcelia Obrien MD     Discharge Physician: Silvia Vidal MD     Discharge Diagnoses: Active Hospital Problems    Diagnosis    Acute cerebrovascular accident (CVA) [facility]) [I63.9]       The patient was seen and examined on day of discharge and this discharge summary is in conjunction with any daily progress note from day of discharge. Hospital Course:     63 yo M with history of CVA presented with L arm weakness and numbness. L arm weakness and numbness - in setting of prior CVA. CTH on admission was negative for acute changes. CTA showed L vertebral artery occlusion, prior CVA, R MCA narrowing with L proximal ICA and distal common carotid with possible mural thrombus. Vascular surgery consulted and recommended antiplatelet agents without anticoagulation and further outpatient follow up for consideration for intervention. Neurology consulted and recommended DAPT x 14 days followed by full dose ASA and uptitration of statin dosing. Neurosurgery consulted, no plan for surgical intervention at this time. Patient refused MRI. Repeat CTH was unchanged. Symptoms gradually resolved to his baseline. ECHO done was negative for thrombus. Patient discharged with plan for DAPT x 14 d followed by ASA alone, PCP, neurology, vassular surgery follow up. Continued home BP medications on d/c. Patient was incidentally noted to have a L parotid nodule that was asymptomatic. Patient encouraged to follow up with ENT as outpatient         Physical Exam Performed:     BP (!) 132/100   Pulse 61   Temp 98.4 °F (36.9 °C) (Oral)   Resp 16   Ht 5' 9\" (1.753 m)   Wt 212 lb 4.9 oz (96.3 kg)   SpO2 98%   BMI 31.35 kg/m²       General appearance:  No apparent distress, appears stated age and cooperative.   HEENT: 11/20/2020. TECHNIQUE: Axial CT imaging obtained through the head prior to and following administration of IV contrast. Axial images, multiplanar reformatted images, and maximum intensity projection images were reviewed for CT angiographic technique. IV contrast: 80 mL Isovue 370. FINDINGS:      ANTERIOR CIRCULATION: Mild calcific plaque of the cavernous right internal carotid artery without stenosis. Anterior cerebral arteries normal. Mild narrowing of M1 segment right middle cerebral artery. POSTERIOR CIRCULATION: Occluded left vertebral artery. Mild atherosclerotic calcification of the right vertebral artery without stenosis. Patent basilar artery. Normal posterior cerebral arteries. INTRACRANIAL VENOUS SYSTEM: Small arachnoid granulations superior sagittal sinus. INTRACRANIAL HEMORRHAGE: None. VENTRICLES: Mildly dilated related to parenchymal volume loss. BRAIN PARENCHYMA: Encephalomalacia of left occipital lobe consistent with remote infarct stable compared to 11/20/2020 CT head. Mild cerebral parenchymal volume loss. Mild low-attenuation in the deep and periventricular white matter. SKULL: No destructive osseous process or fracture. PARANASAL SINUSES / MASTOIDS: No acute sinusitis or mastoiditis. ORBITS: Normal.      EXTRACRANIAL SOFT TISSUES: Normal.      OTHER: None. IMPRESSION:      1. Occlusion left vertebral artery. 2. Narrowing M1 segment right middle cerebral artery. 3. Remote left occipital infarct. 4. Cerebral parenchymal volume loss and chronic small vessel ischemic changes in the white matter. CTA HEAD W CONTRAST   Final Result      1. Occlusion left vertebral artery. 2. 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 3. 1.4 x 0.9 cm left parotid nodule, nonspecific.       PROCEDURE: CT ANGIOGRAPHY HEAD WITH/WITHOUT CONTRAST INDICATION: headache, tingling      COMPARISON: CT head 11/20/2020. TECHNIQUE: Axial CT imaging obtained through the head prior to and following administration of IV contrast. Axial images, multiplanar reformatted images, and maximum intensity projection images were reviewed for CT angiographic technique. IV contrast: 80 mL Isovue 370. FINDINGS:      ANTERIOR CIRCULATION: Mild calcific plaque of the cavernous right internal carotid artery without stenosis. Anterior cerebral arteries normal. Mild narrowing of M1 segment right middle cerebral artery. POSTERIOR CIRCULATION: Occluded left vertebral artery. Mild atherosclerotic calcification of the right vertebral artery without stenosis. Patent basilar artery. Normal posterior cerebral arteries. INTRACRANIAL VENOUS SYSTEM: Small arachnoid granulations superior sagittal sinus. INTRACRANIAL HEMORRHAGE: None. VENTRICLES: Mildly dilated related to parenchymal volume loss. BRAIN PARENCHYMA: Encephalomalacia of left occipital lobe consistent with remote infarct stable compared to 11/20/2020 CT head. Mild cerebral parenchymal volume loss. Mild low-attenuation in the deep and periventricular white matter. SKULL: No destructive osseous process or fracture. PARANASAL SINUSES / MASTOIDS: No acute sinusitis or mastoiditis. ORBITS: Normal.      EXTRACRANIAL SOFT TISSUES: Normal.      OTHER: None. IMPRESSION:      1. Occlusion left vertebral artery. 2. Narrowing M1 segment right middle cerebral artery. 3. Remote left occipital infarct. 4. Cerebral parenchymal volume loss and chronic small vessel ischemic changes in the white matter. CT HEAD WO CONTRAST   Final Result      1. Mild inferior right maxillary sinus mucosal thickening. No evidence of acute sinusitis. 2. Remote cortical infarct left occipital lobe. Mild small vessel ischemic disease. No hemorrhage, mass, or recent infarct identified.

## 2020-11-25 LAB
BLOOD CULTURE, ROUTINE: NORMAL
CULTURE, BLOOD 2: NORMAL

## 2021-02-21 NOTE — PROGRESS NOTES
Clinical Pharmacy Consult Note    62 y.o. male admitted with stroke like symptoms. Pharmacy has been asked by Tanya Garcia PA-C, to adjust all drips to normal saline as appropriate based on compatibility to avoid fluid shifts since D5 is osmotically active. The following intermittent IV drips/infusions have been adjusted to saline:  Azithromycin   Ceftriaxone     Please be aware that patient may have D5W ordered as part of hypoglycemia orderset. Total IV fluid delivered to patient over last 24h: ~300mL    RPh will follow daily to ensure all new IVPBs + drips are in NS. Please call with questions.   Yvon Batista, PharmD, [address] [place] [address] Pharmacy: [phone]  [address] [place] [address] wireless: [phone]  11/21/2020 12:12 PM Right index finger laceration noted with bleeding controlled.     Jese Copeland, RN  02/21/21 [id number]